# Patient Record
Sex: FEMALE | Race: BLACK OR AFRICAN AMERICAN | NOT HISPANIC OR LATINO | URBAN - METROPOLITAN AREA
[De-identification: names, ages, dates, MRNs, and addresses within clinical notes are randomized per-mention and may not be internally consistent; named-entity substitution may affect disease eponyms.]

---

## 2017-09-22 DIAGNOSIS — M54.50 LOW BACK PAIN: Primary | ICD-10-CM

## 2017-10-02 ENCOUNTER — CLINICAL SUPPORT (OUTPATIENT)
Dept: REHABILITATION | Facility: HOSPITAL | Age: 67
End: 2017-10-02
Attending: NEUROLOGICAL SURGERY
Payer: MEDICARE

## 2017-10-02 DIAGNOSIS — G89.29 CHRONIC LOW BACK PAIN, UNSPECIFIED BACK PAIN LATERALITY, WITH SCIATICA PRESENCE UNSPECIFIED: Primary | ICD-10-CM

## 2017-10-02 DIAGNOSIS — M54.5 CHRONIC LOW BACK PAIN, UNSPECIFIED BACK PAIN LATERALITY, WITH SCIATICA PRESENCE UNSPECIFIED: Primary | ICD-10-CM

## 2017-10-02 PROCEDURE — G8978 MOBILITY CURRENT STATUS: HCPCS | Mod: CK

## 2017-10-02 PROCEDURE — G8979 MOBILITY GOAL STATUS: HCPCS | Mod: CK

## 2017-10-02 PROCEDURE — 97161 PT EVAL LOW COMPLEX 20 MIN: CPT

## 2017-10-03 NOTE — PLAN OF CARE
Physical Therapy Evaluation    [facility]  Clinic Number: [id number]      Diagnosis:   Encounter Diagnosis   Name Primary?    Chronic low back pain, unspecified back pain laterality, with sciatica presence unspecified Yes     Physician: Zachery Roberts MD  Treatment Orders: Aquatic Therapy    Past Medical History:   Diagnosis Date    Ulcer      Current Outpatient Prescriptions   Medication Sig    dicyclomine (BENTYL) 20 mg tablet Take 20 mg by mouth as needed.    tramadol (ULTRAM) 50 mg tablet Take 50 mg by mouth as needed for Pain.     No current facility-administered medications for this visit.      Review of patient's allergies indicates:  Allergies not on file  Precautions: none    Evaluation Date: 10/02/2017  Visit # authorized: 1/20  Authorization expiration: 12/31/2017  Plan of Care Expiration: 11/2/2017    Subjective   Onset/LIVAN: gradual    Primary concern/ Chief complaints:    Sunni is a 67 y.o. female with a PMH of none that presents to [facility] outpatient physical therapy secondary to low back pain. Pt reports that her MD states that she has problems with discs in her back. Pt reports that she has pain in her mid-back, near her bra line. Pt reports that she had a back surgery on 8/11/2017 to drain CSF (port that lasted 6 days). Pt reports having no problems in her mid-back until after that time frame. Pt reports difficulty with lifting objects, standing, doing her dishes and bending. Pt was helping her son move out of her house and even with carrying light objects, she would have back pain. Pt uses pain medication as needed to control low back pain symptoms. Pt has a decreased ability to perform ADLs such as lifting, cleaning her house, doing her dishes, bending over. Pt reports intermittent numbness and tingling in the left arm and leg, reported to be mostly gone. Pt currently has some AROM exercises that she participates in, that she learned from when her mother was in PT. No  cultural, environmental, or spiritual barriers identified to treatment or learning.    Pain Scale: Sunni rates pain on a scale of 0-10 to be 8 at worst; 2 currently; n/a at best (first thing in the morning, and on days with low physical activity).    Patient Goals: Pt would like to decrease pain and increase function so she can return to pain-free completion of all normal daily activities.        Objective     Observation: ambulates independently, no brace present    Posture:  Mildly decreased lordosis    Lumbar Range of Motion:    Degrees Pain   Flexion 60 degrees  (60 is norm) -        Extension 30 degrees  (25 is norm) + to B hips        Left Side Bending 25 degrees  (25 is norm) Pain in R QL        Right Side Bending 25 degrees  (25 is norm) Sharp pain on R side of low back        Left Rotation   100% -        Right Rotation   100% -           Throbbing pain at T11-12 after AROM measurements    Lower Extremity Strength  Right LE  Left LE    Knee extension: 4+/5 Knee extension: 4+/5   Knee flexion: 5/5 Knee flexion: 4+/5   Hip flexion: 4+/5 Hip flexion: 4-/5   Hip extension:  4-/5 Hip extension: 4-/5   Hip abduction: 4+/5 Hip abduction: 4+/5   Hip adduction: 5/5 Hip adduction 5/5   Ankle dorsiflexion: 4+/5 Ankle dorsiflexion: 4+/5   Ankle plantarflexion: 5/5 Ankle plantarflexion: 5/5       Special Tests:  -SlumpTest: -  -Bridge Test: +    Joint Mobility: WNL to PAs in standing    Palpation: WNL    Sensation: WNL    Flexibility: decreased in bilateral hamstrings as assessed via presence of knee flexion with lumbar flexion    Outcome measures:    FOTO back: patient score = 57% impairment    G-code mobility current:  CK (indicating 40-60% impaired)    G-code mobility goal:  CK (indicating 40-60% impaired)    PT reviewed FOTO scores for Sunni No on 10/02/2017.   FOTO scores were entered into [employer] - see media section.    PT Evaluation Completed? Yes  Discussed Plan of Care with patient:  Yes    Assessment     This is a 67 y.o. female referred to outpatient physical therapy and presents with a medical diagnosis of low back pain and demonstrates limitations as described in the problem list. Pt will benefit from physical therapy services in order to maximize pain free functional independence. The following goals were discussed with the patient and patient is in agreement with them as to be addressed in the treatment plan.     Pt presents with limitations in lumbar AROM in extension and side bending and presented with mildly decreased lumbar lordosis. Pt presents with tight hamstrings and weak core musculature indicating decreased support of the lumbar spine with completion of ADLs. In order to decrease stresses on the lumbar spine with completion of functional movements, Sunni No  would greatly benefit from stretching of bilateral hamstrings and hip flexors as well as strengthening of core and hip musculature in order to increase dynamic stability at the lumbar spine and improve biomechanics throughout the lumbar spine.       Patient History Examination Clinical Presentation Clinical Decision Making   Comorbidities:  none    Personal Factors:  none       Activity and Participation Restriction:  Mobility  General tasks and demands    Body Systems:  Musculoskeletal      Body Regions:  Lumbar Stable and uncomplicated     Low            Medical necessity is demonstrated by the following IMPAIRMENTS/PROBLEM LIST:   1)Increase in pain level limiting function   2)Decreased range of motion limiting function   3)Decreased strength limiting function   4)Impaired posture   5)Lack of HEP    GOALS: Short Term Goals: 3 weeks  1. Report decreased low back pain  <   / =  2  /10  to increase tolerance for completion of ADLs  2. Pt to increase B popliteal angle by > or = 10 degrees in order to improve flexibility and posture.   3. Increased MMT  for  hip extension to 4+/5 bilaterally to increase tolerance  for ADL and work activities.  4. Pt to achieve 60 degrees of active lumbar flexion in order to demonstrate improvements in mobility.   5. Pt to tolerate HEP to improve ROM and independence with ADL's    Long Term Goals: 6 weeks  1. Report decreased low back pain  <   / =  1 /10  to increase tolerance for completion of ADLs  2. Pt to increase B popliteal angle by > or = 20 degrees in order to improve flexibility and posture.   3. Increased MMT  for  hip extension to 5/5 bilaterally to increase tolerance for ADL and work activities.  4. Pt will report at CJ level (20-40% impaired) on FOTO score for low back pain disability to demonstrate decrease in disability and improvement in back pain.  5. Pt to be Independent with HEP to improve ROM and independence with ADL's  6. Pt to demonstrate negative Bridge Test in order to show improved core strength for lumbar stabilization.     Plan     Pt will be treated by physical therapy 1-3 times a week for 6 weeks for Pt Education, HEP, therapeutic exercises, neuromuscular re-education, joint mobilizations, modalities prn to achieve established goals. Sunni may at times be seen by a PTA as part of the Rehab Team.     Cont PT for 6 weeks.     Kellie Stafford, DPT  10/02/2017.     I certify the need for these services furnished under this plan of treatment and while under my care.    ______________________________ Physician/Referring Practitioner  Date of Signature

## 2017-10-04 ENCOUNTER — CLINICAL SUPPORT (OUTPATIENT)
Dept: REHABILITATION | Facility: HOSPITAL | Age: 67
End: 2017-10-04
Attending: NEUROLOGICAL SURGERY
Payer: MEDICARE

## 2017-10-04 DIAGNOSIS — M54.40 CHRONIC LOW BACK PAIN WITH SCIATICA, SCIATICA LATERALITY UNSPECIFIED, UNSPECIFIED BACK PAIN LATERALITY: Primary | ICD-10-CM

## 2017-10-04 DIAGNOSIS — G89.29 CHRONIC LOW BACK PAIN WITH SCIATICA, SCIATICA LATERALITY UNSPECIFIED, UNSPECIFIED BACK PAIN LATERALITY: Primary | ICD-10-CM

## 2017-10-04 PROCEDURE — 97113 AQUATIC THERAPY/EXERCISES: CPT

## 2017-10-05 NOTE — PROGRESS NOTES
"?  Aquatic Therapy Encounter Note   Diagnosis:   Encounter Diagnosis   Name Primary?    Chronic low back pain, unspecified back pain laterality, with sciatica presence unspecified Yes      Physician: Zachery Roberts MD    Subjective:Pt states feeling fine with mild pain on her lower back. Pt does not verbalize a pain number.     Objective:  Treatment: Pt was instructed in and performed aquatic therex to help develop strength, core stability and flexibility/rom.  Aquatic Therex included:  Warm up laps fwd/bwd/side 3 laps each  HSS 3x20"  Quad str 3x20" (NP)   Lower extremity aquatic therex: 10 each  -Mini squats with quad set  -Heel raise with glut set  -Hip flx with laq  -Hip ext with hs curl  -Lunges front/back/side/side  Upper extremity aquatic therex: 10 reps each  -shld flx/ext  -shld horiz abd/add  -Lat pull (NP)  -shld rows (NP)   Marching:3 minutes   Bicycling:(NP)   Cool down laps fwd/bwd/side 1 lap each  HEP reviewed with pt with verbal understanding.    Assessment: Pt with good tolerance to PT today, increase of time to complete task; therefore, some exercises not performed secondary to poor endurance. Progressing with aquatic therapy. Pt will continue to benefit from skilled PT intervention.   Medical necessity is demonstrated by pt's:  Continued pain  Decreased functional activity  Limited strength and mobility  Goals: continue with established goals  Progress towards goals is:good   Plan: continue with established plan of care per PT. PT/PTA face-to-face:discussed pt's POC and progress towards established PT goals.  Louise Locke PTA'  Kellie Stafford PT      ?  "

## 2017-10-11 ENCOUNTER — CLINICAL SUPPORT (OUTPATIENT)
Dept: REHABILITATION | Facility: HOSPITAL | Age: 67
End: 2017-10-11
Attending: NEUROLOGICAL SURGERY
Payer: MEDICARE

## 2017-10-11 DIAGNOSIS — G89.29 CHRONIC MIDLINE LOW BACK PAIN WITH SCIATICA, SCIATICA LATERALITY UNSPECIFIED: Primary | ICD-10-CM

## 2017-10-11 DIAGNOSIS — M54.40 CHRONIC MIDLINE LOW BACK PAIN WITH SCIATICA, SCIATICA LATERALITY UNSPECIFIED: Primary | ICD-10-CM

## 2017-10-11 PROCEDURE — 97113 AQUATIC THERAPY/EXERCISES: CPT

## 2017-10-11 NOTE — PROGRESS NOTES
"?  Aquatic Therapy Encounter Note   Diagnosis:        Encounter Diagnosis   Name Primary?    Chronic low back pain, unspecified back pain laterality, with sciatica presence unspecified Yes      Physician: Zachery Roberts MD    Subjective:Pt states that she was very sore after last PT visit; feeling muscles that she has not worked before. Pt states having mild pain now but had an increase of pain yesterday after picking up heavy (~5lbs) boxes. Pt states that she took on pain pill yesterday.     Objective:  Treatment: Pt was instructed in and performed aquatic therex to help develop strength, core stability and flexibility/rom.  Aquatic Therex included:  Warm up laps fwd/bwd/side 3 laps each  HSS 3x20"  Quad str 3x20"   Lower extremity aquatic therex: 15 each  -Mini squats with quad set  -Heel raise with glut set  -Hip abduction   -Hip flx with laq  -Hip ext with hs curl  -Lunges front/back/side/side  Upper extremity aquatic therex: 10 reps each  -shld flx/ext  -shld horiz abd/add  -Lat pull otb  -shld rows rtb   Marching:3 minutes   Bicycling:(NP)   Cool down laps fwd/bwd/side 1 lap each  HEP reviewed with pt with verbal understanding.    Assessment: Pt with good tolerance to PT today, improving body mechanics while performing therex. Progressing with aquatic therapy. Pt will continue to benefit from skilled PT intervention.   Medical necessity is demonstrated by pt's:  Continued pain  Decreased functional activity  Limited strength and mobility  Goals: continue with established goals  Progress towards goals is:good     Plan: continue with established plan of care per PT. ?  "

## 2017-10-13 ENCOUNTER — CLINICAL SUPPORT (OUTPATIENT)
Dept: REHABILITATION | Facility: HOSPITAL | Age: 67
End: 2017-10-13
Attending: NEUROLOGICAL SURGERY
Payer: MEDICARE

## 2017-10-13 DIAGNOSIS — G89.29 CHRONIC MIDLINE LOW BACK PAIN WITH SCIATICA, SCIATICA LATERALITY UNSPECIFIED: Primary | ICD-10-CM

## 2017-10-13 DIAGNOSIS — M54.40 CHRONIC MIDLINE LOW BACK PAIN WITH SCIATICA, SCIATICA LATERALITY UNSPECIFIED: Primary | ICD-10-CM

## 2017-10-13 PROCEDURE — 97113 AQUATIC THERAPY/EXERCISES: CPT

## 2017-10-13 NOTE — PROGRESS NOTES
"                                                                                                                         Aquatic Progress Note    Total treatment time: 60    Time In: 1:00  Time Out: 2:05      Subjective  Sunni states "that she is w/o LBP at this time. Pt states "It feels like a knife is stabbing me in the back"  with horiz row ex      Objective    Treatment: Sunni was instructed in and performed therapeutic exercises to develop strength, endurance, ROM, flexibility, balance, posture and core stabilization for 60 minutes, progressing with reps on this date . Patient performed therapeutic exercises consisting of warm up laps without resistance, PROM/Stretching, UE strengthening, LE strengthening, lumbar stablization, balance exercises, isometrics, Endurance, march, theraband and cool down.      Warm up laps fwd/bwd/side 3 laps each    HSS 3x20"  Quad str 3x20"     Lower extremity exs: 15 each  -Mini squats with quad set  -Heel raise with glut set  -Hip flx with laq  -Hip ext with hs curl  -Lunges front/side    Upper extremity exs: 15 reps each  -shld flx/ext  -shld horiz abd/add  -Lat pull otb  -shld rows rtb     Endurance 4 min  Marching:   Bicycling:(NP)     Cool down laps fwd/bwd/side 1 lap each     Patient was not issued HEP for pool.      Assessment  Patient's tolerance to treatment with progression of ther ex was good. This is a 67 y.o. female referred to outpatient physical therapy and presents with a medical diagnosis of low back pain and demonstrates limitations as described in the problem list. Pt will benefit from physical therapy services in order to maximize pain free functional independence.  Patient will continue to benefit from skilled PT intervention.    Sunni is making good progress towards established goals.      Plan  Continue 2x per week.    "

## 2017-10-17 ENCOUNTER — CLINICAL SUPPORT (OUTPATIENT)
Dept: REHABILITATION | Facility: HOSPITAL | Age: 67
End: 2017-10-17
Attending: NEUROLOGICAL SURGERY
Payer: MEDICARE

## 2017-10-17 DIAGNOSIS — G89.29 CHRONIC MIDLINE LOW BACK PAIN WITH SCIATICA, SCIATICA LATERALITY UNSPECIFIED: Primary | ICD-10-CM

## 2017-10-17 DIAGNOSIS — M54.40 CHRONIC MIDLINE LOW BACK PAIN WITH SCIATICA, SCIATICA LATERALITY UNSPECIFIED: Primary | ICD-10-CM

## 2017-10-17 PROCEDURE — 97113 AQUATIC THERAPY/EXERCISES: CPT

## 2017-10-17 NOTE — PROGRESS NOTES
"                                                                                                                         Aquatic Progress Note    Total treatment time: 60    Time In: 2:00  Time Out: 3:05      Subjective  Sunni states "that she is w/o LBP at this time. and that she has felt better since last tx.       Objective    Treatment: Sunni was instructed in and performed therapeutic exercises to develop strength, endurance, ROM, flexibility, balance, posture and core stabilization for 60 minutes, progressing with reps on this date . Patient performed therapeutic exercises consisting of warm up laps without resistance, PROM/Stretching, UE strengthening, LE strengthening, lumbar stablization, balance exercises, isometrics, Endurance, march, theraband and cool down.      Warm up laps fwd/bwd/side 3 laps each    HSS 3x20"  Quad str 3x20"     Lower extremity exs: 20 each  -Mini squats with quad set  -Heel raise with glut set  -Hip flx with laq  -Hip ext with hs curl  -Lunges front/side    Upper extremity exs: 20 reps each  -shld flx/ext  -shld horiz abd/add  -Lat pull otb  -shld rows rtb     Endurance 4 min  Marching:   Bicycling:(NP)     Cool down laps fwd/bwd/side 1 lap each     Patient was not issued HEP for pool.      Assessment  Patient's tolerance to treatment with progression of ther ex was good. This is a 67 y.o. female referred to outpatient physical therapy and presents with a medical diagnosis of low back pain and demonstrates limitations as described in the problem list. Pt will benefit from physical therapy services in order to maximize pain free functional independence.  Patient will continue to benefit from skilled PT intervention.    Sunni is making good progress towards established goals.      Plan  Continue 2x per week.  "

## 2017-10-20 ENCOUNTER — CLINICAL SUPPORT (OUTPATIENT)
Dept: REHABILITATION | Facility: HOSPITAL | Age: 67
End: 2017-10-20
Attending: NEUROLOGICAL SURGERY
Payer: MEDICARE

## 2017-10-20 DIAGNOSIS — G89.29 CHRONIC MIDLINE LOW BACK PAIN WITH SCIATICA, SCIATICA LATERALITY UNSPECIFIED: Primary | ICD-10-CM

## 2017-10-20 DIAGNOSIS — M54.40 CHRONIC MIDLINE LOW BACK PAIN WITH SCIATICA, SCIATICA LATERALITY UNSPECIFIED: Primary | ICD-10-CM

## 2017-10-20 PROCEDURE — 97113 AQUATIC THERAPY/EXERCISES: CPT

## 2017-10-20 NOTE — PROGRESS NOTES
"                                                                                                                         Aquatic Progress Note    Total treatment time: 60    Time In: 1:00  Time Out: 2:05      Subjective  Sunni reports 5/10 mid LBP today       Objective    Treatment: Sunni was instructed in and performed therapeutic exercises to develop strength, endurance, ROM, flexibility, balance, posture and core stabilization for 60 minutes, progressing with reps on this date . Patient performed therapeutic exercises consisting of warm up laps without resistance, PROM/Stretching, UE strengthening, LE strengthening, lumbar stablization, balance exercises, isometrics, Endurance, march, theraband and cool down.      Warm up laps fwd/bwd/side 3 laps each    HSS 3x20"  Quad str 3x20"     Lower extremity exs: 30 each  -Mini squats with quad set  -Heel raise with glut set  -Hip flx with laq  -Hip ext with hs curl  -Lunges front/side    Upper extremity exs: 30 reps each  -shld flx/ext  -shld horiz abd/add  -Lat pull otb  -shld rows rtb     Endurance 5 min  Marching:   Bicycling:(NP)     Cool down laps fwd/bwd/side 1 lap each     Patient was not issued HEP for pool.      Assessment  Patient's tolerance to treatment with progression of ther ex was good. This is a 67 y.o. female referred to outpatient physical therapy and presents with a medical diagnosis of low back pain and demonstrates limitations as described in the problem list. Pt will benefit from physical therapy services in order to maximize pain free functional independence.  Patient will continue to benefit from skilled PT intervention.    Sunni is making good progress towards established goals.      Plan  Continue 2x per week.  "

## 2017-10-25 ENCOUNTER — CLINICAL SUPPORT (OUTPATIENT)
Dept: REHABILITATION | Facility: HOSPITAL | Age: 67
End: 2017-10-25
Attending: NEUROLOGICAL SURGERY
Payer: MEDICARE

## 2017-10-25 DIAGNOSIS — M54.40 CHRONIC MIDLINE LOW BACK PAIN WITH SCIATICA, SCIATICA LATERALITY UNSPECIFIED: Primary | ICD-10-CM

## 2017-10-25 DIAGNOSIS — G89.29 CHRONIC MIDLINE LOW BACK PAIN WITH SCIATICA, SCIATICA LATERALITY UNSPECIFIED: Primary | ICD-10-CM

## 2017-10-25 PROCEDURE — G8979 MOBILITY GOAL STATUS: HCPCS | Mod: CK

## 2017-10-25 PROCEDURE — G8978 MOBILITY CURRENT STATUS: HCPCS | Mod: CK

## 2017-10-25 PROCEDURE — 97110 THERAPEUTIC EXERCISES: CPT

## 2017-10-25 NOTE — PROGRESS NOTES
Physical Therapy Pool Reassessment     Name: Sunni Andrade  Clinic Number: [id number]        Diagnosis:        Encounter Diagnosis   Name Primary?    Chronic low back pain, unspecified back pain laterality, with sciatica presence unspecified Yes      Physician: Zachery Roberts MD  Treatment Orders: Aquatic Therapy          Past Medical History:   Diagnosis Date    Ulcer             Current Outpatient Prescriptions   Medication Sig    dicyclomine (BENTYL) 20 mg tablet Take 20 mg by mouth as needed.    tramadol (ULTRAM) 50 mg tablet Take 50 mg by mouth as needed for Pain.      No current facility-administered medications for this visit.       Review of patient's allergies indicates:  Allergies not on file  Precautions: none     Evaluation Date: 10/02/2017  Visit # authorized: 6/20  Authorization expiration: 12/31/2017  Plan of Care Expiration: 11/2/2017     Subjective   Onset/LIVAN: gradual     Primary concern/ Chief complaints:     Sunni is a 67 y.o. female with a PMH of none that presents to [facility] outpatient physical therapy secondary to low back pain. Pt reports that her MD states that she has problems with discs in her back. Pt reports that she has pain in her mid-back, near her bra line. Pt reports that she had a back surgery on 8/11/2017 to drain CSF (port that lasted 6 days). Pt reports having no problems in her mid-back until after that time frame. Pt reports difficulty with lifting objects, standing, doing her dishes and bending. Pt was helping her son move out of her house and even with carrying light objects, she would have back pain. Pt uses pain medication as needed to control low back pain symptoms. Pt has a decreased ability to perform ADLs such as lifting, cleaning her house, doing her dishes, bending over. Pt reports intermittent numbness and tingling in the left arm and leg, reported to be mostly gone. Pt currently has some AROM exercises that she participates in, that she learned from  when her mother was in PT. No cultural, environmental, or spiritual barriers identified to treatment or learning.     Since starting aqua therapy, pt reports: Doing well in the pool and doesn't have pain in the mornings like she used to. Has more mid-day pain and reports no more pain when she puts on her bra. Is able to wash dishes with decreased difficulty. No numbness or tingling in the left arm and leg.    Pain Scale: Sunni rates pain on a scale of 0-10 to be 8 at worst; 4 currently; n/a at best (first thing in the morning, and on days with low physical activity).     Patient Goals: Pt would like to decrease pain and increase function so she can return to pain-free completion of all normal daily activities.           Objective      Observation: ambulates independently, no brace present     Posture:  Mildly decreased lordosis     Lumbar Range of Motion:     Degrees Pain   Flexion 60 degrees  (60 is norm) Pain in low back and mid back with bending forward   Extension 30 degrees  (25 is norm) NO PAIN TODAY    Left Side Bending 25 degrees  (25 is norm) Stretching in R QL   Right Side Bending 25 degrees  (25 is norm) Mild pain in low back (decreased compared to initial evaluation)   Left Rotation 100% -   Right Rotation 100% -         Lower Extremity Strength  Right LE   Left LE     Knee extension: 5/5 Knee extension: 4+/5   Knee flexion: 5/5 Knee flexion: 4+/5   Hip flexion: 5/5 Hip flexion: 4-/5   Hip extension:  4/5 Hip extension: 4-/5   Hip abduction: 4+/5 Hip abduction: 4+/5   Hip adduction: 5/5 Hip adduction 5/5   Ankle dorsiflexion: 4+/5 Ankle dorsiflexion: 4+/5   Ankle plantarflexion: 5/5 Ankle plantarflexion: 5/5         Special Tests:  -SlumpTest: -  -Bridge Test: +     Pt received therapeutic exercises for 20 minutes including: supine HSS 3x30 sec BLE. And standing multifidus press-outs 2x10 both sides with GTB     Assessment      This is a 67 y.o. female referred to outpatient physical therapy and  presents with a medical diagnosis of low back pain and demonstrates limitations as described in the problem list. Pt will benefit from physical therapy services in order to maximize pain free functional independence. The following goals were discussed with the patient and patient is in agreement with them as to be addressed in the treatment plan.      Pt presents with limitations in lumbar AROM in extension and side bending and presented with mildly decreased lumbar lordosis, but made improvements in pain presentation today. Mild increases in strength noted in the R side, but remains weak on her left with back pain caused by seated hip flexion. Pt is progressing well and was given a HEP today including multifidus press-outs and hamstring stretches (HEP2go code = [id number]). Pt presents with tight hamstrings and weak core musculature indicating decreased support of the lumbar spine with completion of ADLs. In order to decrease stresses on the lumbar spine with completion of functional movements, Sunni No  would greatly benefit from stretching of bilateral hamstrings and hip flexors as well as strengthening of core and hip musculature in order to increase dynamic stability at the lumbar spine and improve biomechanics throughout the lumbar spine.         Patient History Examination Clinical Presentation Clinical Decision Making   Comorbidities:  none     Personal Factors:  none       Activity and Participation Restriction:  Mobility  General tasks and demands     Body Systems:  Musculoskeletal        Body Regions:  Lumbar Stable and uncomplicated    Low             Medical necessity is demonstrated by the following IMPAIRMENTS/PROBLEM LIST:              1)Increase in pain level limiting function              2)Decreased range of motion limiting function              3)Decreased strength limiting function              4)Impaired posture              5)Lack of HEP     GOALS: Short Term Goals: 3 weeks  1.  Report decreased low back pain  <   / =  2  /10  to increase tolerance for completion of ADLs  2. Pt to increase B popliteal angle by > or = 10 degrees in order to improve flexibility and posture.   3. Increased MMT  for  hip extension to 4+/5 bilaterally to increase tolerance for ADL and work activities.  4. Pt to achieve 60 degrees of active lumbar flexion in order to demonstrate improvements in mobility.  MET:10/25/2017  5. Pt to tolerate HEP to improve ROM and independence with ADL's MET:10/25/2017     Long Term Goals: 6 weeks  1. Report decreased low back pain  <   / =  1 /10  to increase tolerance for completion of ADLs  2. Pt to increase B popliteal angle by > or = 20 degrees in order to improve flexibility and posture.   3. Increased MMT  for  hip extension to 5/5 bilaterally to increase tolerance for ADL and work activities.  4. Pt will report at CJ level (20-40% impaired) on FOTO score for low back pain disability to demonstrate decrease in disability and improvement in back pain.  5. Pt to be Independent with HEP to improve ROM and independence with ADL's  6. Pt to demonstrate negative Bridge Test in order to show improved core strength for lumbar stabilization.      Plan      Pt will be treated by physical therapy 1-3 times a week for 6 weeks for Pt Education, HEP, therapeutic exercises, neuromuscular re-education, joint mobilizations, modalities prn to achieve established goals. Sunni may at times be seen by a PTA as part of the Rehab Team.      Cont PT for 6 weeks.      Kellie Stafford, NELSON  10/25/2017.      I certify the need for these services furnished under this plan of treatment and while under my care.     ______________________________ Physician/Referring Practitioner  Date of Signature

## 2017-11-03 ENCOUNTER — CLINICAL SUPPORT (OUTPATIENT)
Dept: REHABILITATION | Facility: HOSPITAL | Age: 67
End: 2017-11-03
Attending: NEUROLOGICAL SURGERY
Payer: MEDICARE

## 2017-11-03 DIAGNOSIS — M54.40 CHRONIC LOW BACK PAIN WITH SCIATICA, SCIATICA LATERALITY UNSPECIFIED, UNSPECIFIED BACK PAIN LATERALITY: Primary | ICD-10-CM

## 2017-11-03 DIAGNOSIS — G89.29 CHRONIC LOW BACK PAIN WITH SCIATICA, SCIATICA LATERALITY UNSPECIFIED, UNSPECIFIED BACK PAIN LATERALITY: Primary | ICD-10-CM

## 2017-11-03 PROCEDURE — 97113 AQUATIC THERAPY/EXERCISES: CPT

## 2017-11-03 NOTE — PROGRESS NOTES
"                                                                                                                         Aquatic Progress Note    Total treatment time: 60    Time In: 2:00  Time Out: 3:05      Subjective  Sunni states that her back is feeling better today, reports 2/10 mid LBP today       Objective    Treatment: Sunni was instructed in and performed therapeutic exercises to develop strength, endurance, ROM, flexibility, balance, posture and core stabilization for 60 minutes, progressing with reps on this date . Patient performed therapeutic exercises consisting of warm up laps without resistance, PROM/Stretching, UE strengthening, LE strengthening, lumbar stablization, balance exercises, isometrics, Endurance, march, theraband and cool down.      Warm up laps fwd/bwd/side 3 laps each    HSS 3x20"  Quad str 3x20"     Lower extremity exs: 30 each  -Mini squats with quad set  -Heel raise with glut set  -Hip flx with laq  -Hip ext with hs curl  -Lunges front/side    Upper extremity exs: 30 reps each  -shld flx/ext  -shld horiz abd/add  -Lat pull otb  -shld rows rtb     Endurance 5 min  Marching:   Bicycling:(NP)     Cool down laps fwd/bwd/side 1 lap each     Patient was not issued HEP for pool.      Assessment  Patient's tolerance to treatment with ther ex was good, w/o c/o LBP sx throughout tx, minor bal difficulty with core stab exs, however pt was able to self correct. Pt was not progressed on this date due to length of time since last tx. This is a 67 y.o. female referred to outpatient physical therapy and presents with a medical diagnosis of low back pain and demonstrates limitations as described in the problem list. Pt will benefit from physical therapy services in order to maximize pain free functional independence.  Patient will continue to benefit from skilled PT intervention.    Sunni is making good progress towards established goals.      Plan  Continue 2x per week.  "

## 2017-11-08 ENCOUNTER — CLINICAL SUPPORT (OUTPATIENT)
Dept: REHABILITATION | Facility: HOSPITAL | Age: 67
End: 2017-11-08
Attending: NEUROLOGICAL SURGERY
Payer: MEDICARE

## 2017-11-08 DIAGNOSIS — G89.29 CHRONIC LOW BACK PAIN WITH SCIATICA, SCIATICA LATERALITY UNSPECIFIED, UNSPECIFIED BACK PAIN LATERALITY: Primary | ICD-10-CM

## 2017-11-08 DIAGNOSIS — M54.40 CHRONIC LOW BACK PAIN WITH SCIATICA, SCIATICA LATERALITY UNSPECIFIED, UNSPECIFIED BACK PAIN LATERALITY: Primary | ICD-10-CM

## 2017-11-08 PROCEDURE — 97113 AQUATIC THERAPY/EXERCISES: CPT

## 2017-11-08 NOTE — PROGRESS NOTES
"                                                                                                                         Aquatic Progress Note    Total treatment time: 60    Time In: 2:30  Time Out: 3:35      Subjective  Sunni states that her back pain has increased today and reports 5 or 6/10 mid LBP today.       Objective    Treatment: Sunni was instructed in and performed therapeutic exercises to develop strength, endurance, ROM, flexibility, balance, posture and core stabilization for 60 minutes, progressing with reps on this date . Patient performed therapeutic exercises consisting of warm up laps without resistance, PROM/Stretching, UE strengthening, LE strengthening, lumbar stablization, balance exercises, isometrics, Endurance, march, theraband and cool down.      Warm up laps fwd/bwd/side 3 laps each    HSS 3x20"  Quad str 3x20"     Lower extremity exs: 30x each  -Mini squats with quad set  -Heel raise with glut set  -Hip flx with laq  -Hip ext with hs curl  -Lunges fwd 15x    Upper extremity exs: 30 reps each  -shld flx/ext  -shld horiz abd/add  -Lat pull otb  -shld rows rtb     Endurance 5 min  Marching:   Bicycling:(NP)     Cool down laps fwd/bwd/side 1 lap each     Patient was not issued HEP for pool.      Assessment  Pt c/o of LBP post lateral lunges, hence, that exercise was attempted but not performed during today's tx session. Pt demonstrated minor bal difficulty with core stab exs, however pt was able to self correct. Pt was not progressed on this date due to increased LBP upon arrival. This is a 67 y.o. female referred to outpatient physical therapy and presents with a medical diagnosis of low back pain and demonstrates limitations as described in the problem list. Pt will benefit from physical therapy services in order to maximize pain free functional independence.  Patient will continue to benefit from skilled PT intervention.    Sunni is making good progress towards established " goals.      Plan  Continue 2x per week.

## 2017-11-13 ENCOUNTER — CLINICAL SUPPORT (OUTPATIENT)
Dept: REHABILITATION | Facility: HOSPITAL | Age: 67
End: 2017-11-13
Attending: NEUROLOGICAL SURGERY
Payer: MEDICARE

## 2017-11-13 DIAGNOSIS — G89.29 CHRONIC LOW BACK PAIN WITH SCIATICA, SCIATICA LATERALITY UNSPECIFIED, UNSPECIFIED BACK PAIN LATERALITY: Primary | ICD-10-CM

## 2017-11-13 DIAGNOSIS — M54.40 CHRONIC LOW BACK PAIN WITH SCIATICA, SCIATICA LATERALITY UNSPECIFIED, UNSPECIFIED BACK PAIN LATERALITY: Primary | ICD-10-CM

## 2017-11-13 PROCEDURE — 97113 AQUATIC THERAPY/EXERCISES: CPT

## 2017-11-13 NOTE — PROGRESS NOTES
"                                                                                                                         Aquatic Progress Note      Subjective  Sunni rates pain 6/10 today.        Objective    Treatment: Sunni was instructed in and performed therapeutic exercises to develop strength, endurance, ROM, flexibility, balance, posture and core stabilization for 60 minutes, progressing with reps on this date . Patient performed therapeutic exercises consisting of warm up laps without resistance, PROM/Stretching, UE strengthening, LE strengthening, lumbar stablization, balance exercises, isometrics, Endurance, march, theraband and cool down.      Warm up laps fwd/bwd/side 3 laps each    HSS 3x20"  Quad str 3x20"     Lower extremity exs: 30x each  -Mini squats with quad set  -Heel raise with glut set  -Hip flx with laq  -Hip ext with hs curl  -Lunges fwd 15x    Upper extremity exs: 30 reps each  -shld flx/ext  -shld horiz abd/add  -Lat pull otb  -shld rows rtb     Endurance 5 min  Marching:   Bicycling:(NP)     Cool down laps fwd/bwd/side 1 lap each     Patient was not issued HEP for pool.      Assessment  Pt with good tolerance to PT today, needs visual cues for proper body mechanics while performing stretches. Pt demonstrated minor bal difficulty with core stab exs, however pt was able to self correct. Pt was not progressed on this date due to increased LBP upon arrival. This is a 67 y.o. female referred to outpatient physical therapy and presents with a medical diagnosis of low back pain and demonstrates limitations as described in the problem list. Pt will benefit from physical therapy services in order to maximize pain free functional independence.  Patient will continue to benefit from skilled PT intervention.    Sunni is making good progress towards established goals.      Plan  Continue 2x per week.  "

## 2017-11-15 ENCOUNTER — CLINICAL SUPPORT (OUTPATIENT)
Dept: REHABILITATION | Facility: HOSPITAL | Age: 67
End: 2017-11-15
Attending: NEUROLOGICAL SURGERY
Payer: MEDICARE

## 2017-11-15 DIAGNOSIS — G89.29 CHRONIC LOW BACK PAIN WITH SCIATICA, SCIATICA LATERALITY UNSPECIFIED, UNSPECIFIED BACK PAIN LATERALITY: Primary | ICD-10-CM

## 2017-11-15 DIAGNOSIS — M54.40 CHRONIC LOW BACK PAIN WITH SCIATICA, SCIATICA LATERALITY UNSPECIFIED, UNSPECIFIED BACK PAIN LATERALITY: Primary | ICD-10-CM

## 2017-11-15 PROCEDURE — 97113 AQUATIC THERAPY/EXERCISES: CPT

## 2017-11-22 ENCOUNTER — CLINICAL SUPPORT (OUTPATIENT)
Dept: REHABILITATION | Facility: HOSPITAL | Age: 67
End: 2017-11-22
Attending: NEUROLOGICAL SURGERY
Payer: MEDICARE

## 2017-11-22 DIAGNOSIS — G89.29 CHRONIC LOW BACK PAIN WITH SCIATICA, SCIATICA LATERALITY UNSPECIFIED, UNSPECIFIED BACK PAIN LATERALITY: Primary | ICD-10-CM

## 2017-11-22 DIAGNOSIS — M54.40 CHRONIC LOW BACK PAIN WITH SCIATICA, SCIATICA LATERALITY UNSPECIFIED, UNSPECIFIED BACK PAIN LATERALITY: Primary | ICD-10-CM

## 2017-11-22 PROCEDURE — 97113 AQUATIC THERAPY/EXERCISES: CPT

## 2017-11-22 NOTE — PROGRESS NOTES
"                                                                                                                         Aquatic Progress Note      Subjective  Sunni states feeling fine without pain today but had 8/10 pain on her middle back.    Objective    Treatment: Sunni was instructed in and performed therapeutic exercises to develop strength, endurance, ROM, flexibility, balance, posture and core stabilization for 60 minutes, progressing with reps on this date . Patient performed therapeutic exercises consisting of warm up laps without resistance, PROM/Stretching, UE strengthening, LE strengthening, lumbar stablization, balance exercises, isometrics, Endurance, march, theraband and cool down.      Warm up laps fwd/bwd/side 3 laps each    HSS 3x20"  Quad str 3x20"     Lower extremity exs: 30x each  -Mini squats with quad set  -Heel raise with glut set  -Hip flx with laq  -Hip ext with hs curl  -Lunges fwd 15x    Upper extremity exs: 30 reps each  -shld flx/ext  -shld horiz abd/add  -Lat pull otb  -shld rows rtb     Endurance 5 min  Marching:   Bicycling:(NP)     Cool down laps fwd/bwd/side 1 lap each     Patient was not issued HEP for pool.      Assessment  Pt with good tolerance to PT today, needs visual cues for proper body mechanics while performing stretches. Pt demonstrated minor bal difficulty with core stab exs, however pt was able to self correct. Pt was not progressed on this date due to increased LBP upon arrival. This is a 67 y.o. female referred to outpatient physical therapy and presents with a medical diagnosis of low back pain and demonstrates limitations as described in the problem list. Pt will benefit from physical therapy services in order to maximize pain free functional independence.  Patient will continue to benefit from skilled PT intervention.    Sunni is making good progress towards established goals.      Plan  Pt will see PT next visit for re assessment.   "

## 2017-12-01 ENCOUNTER — CLINICAL SUPPORT (OUTPATIENT)
Dept: REHABILITATION | Facility: HOSPITAL | Age: 67
End: 2017-12-01
Attending: NEUROLOGICAL SURGERY
Payer: MEDICARE

## 2017-12-01 DIAGNOSIS — M54.40 CHRONIC LOW BACK PAIN WITH SCIATICA, SCIATICA LATERALITY UNSPECIFIED, UNSPECIFIED BACK PAIN LATERALITY: Primary | ICD-10-CM

## 2017-12-01 DIAGNOSIS — G89.29 CHRONIC LOW BACK PAIN WITH SCIATICA, SCIATICA LATERALITY UNSPECIFIED, UNSPECIFIED BACK PAIN LATERALITY: Primary | ICD-10-CM

## 2017-12-01 PROCEDURE — G8978 MOBILITY CURRENT STATUS: HCPCS | Mod: CK

## 2017-12-01 PROCEDURE — G8979 MOBILITY GOAL STATUS: HCPCS | Mod: CK

## 2017-12-01 PROCEDURE — 97110 THERAPEUTIC EXERCISES: CPT

## 2017-12-01 NOTE — PROGRESS NOTES
Physical Therapy Pool Reassessment     Name: Sunni No  Clinic Number: [id number]        Diagnosis:        Encounter Diagnosis   Name Primary?    Chronic low back pain, unspecified back pain laterality, with sciatica presence unspecified Yes      Physician: Zachery Roberts MD  Treatment Orders: Aquatic Therapy          Past Medical History:   Diagnosis Date    Ulcer             Current Outpatient Prescriptions   Medication Sig    dicyclomine (BENTYL) 20 mg tablet Take 20 mg by mouth as needed.    tramadol (ULTRAM) 50 mg tablet Take 50 mg by mouth as needed for Pain.      No current facility-administered medications for this visit.       Review of patient's allergies indicates:  Allergies not on file  Precautions: none     Evaluation Date: 10/02/2017  Visit # authorized: 6/20  Authorization expiration: 12/31/2017  Plan of Care Expiration: 11/2/2017     Subjective   Onset/LIVAN: gradual     Primary concern/ Chief complaints:     Sunni is a 67 y.o. female with a PMH of none that presents to [facility] outpatient physical therapy secondary to low back pain.      Has been continuing to do well in the pool. Stubbed her toe and dislocated her left first toe. Was able to relocate but had to lie in bed for 2-3 days until she was able to walk better. Had a moment when doing laundry and had increased pain after that due to the bending and lifting. Is still taking care of her mother who has dementia, and when helping bathe her, she will have enough pain where she has to lie down for 2-3 hours after. Reports feeling better in her mid back. Reports a moment of tingling in the left arm and leg when doing household chores. Was able to decrease her pain with 800mg ibuprofen, but tried to take it as little as possible. Standing is better, but she will still get stabbing pain in her back. Her pain is milder.     Pain Scale: Sunni rates pain on a scale of 0-10 to be 8 at worst; 4 currently; n/a at best (first thing in the  morning, and on days with low physical activity).     Patient Goals: Pt would like to decrease pain and increase function so she can return to pain-free completion of all normal daily activities.           Objective      Observation: ambulates independently, no brace present     Posture:  Mildly decreased lordosis     Lumbar Range of Motion:     Degrees Pain   Flexion 60 degrees  (60 is norm) Pain in low back with bending forward   Extension 30 degrees  (25 is norm) NO PAIN TODAY    Left Side Bending 25 degrees  (25 is norm) Mild stretch   Right Side Bending 25 degrees  (25 is norm) Mild right hip pain, described as a strong ache   Left Rotation 100% -   Right Rotation 100% -         Lower Extremity Strength  Right LE   Left LE     Knee extension: 5/5 Knee extension: 4+/5   Knee flexion: 5/5 Knee flexion: 5/5   Hip flexion: 5/5 Hip flexion: *painful 4-/5   Hip extension:  4/5 Hip extension: 4/5   Hip abduction: 4+/5 Hip abduction: 4+/5   Hip adduction: 5/5 Hip adduction 5/5   Ankle dorsiflexion: 4+/5 Ankle dorsiflexion: 4+/5   Ankle plantarflexion: 5/5 Ankle plantarflexion: 5/5         Special Tests:  -SlumpTest: -  -Bridge Test: +     Pt received therapeutic exercises for 20 minutes including: supine HSS 3x30 sec BLE. And standing multifidus press-outs 2x10 both sides with GTB     Assessment      This is a 67 y.o. female referred to outpatient physical therapy and presents with a medical diagnosis of low back pain and demonstrates limitations as described in the problem list. Pt will benefit from physical therapy services in order to maximize pain free functional independence. The following goals were discussed with the patient and patient is in agreement with them as to be addressed in the treatment plan.      Pt presents with limitations in lumbar AROM in extension and side bending and presented with mildly decreased lumbar lordosis, but made improvements in pain presentation today. Mild increases in strength  noted in the R side, but remains weak on her left with back pain caused by seated hip flexion. Pt is progressing well and was given a HEP today including multifidus press-outs and hamstring stretches (HEP2go code = [id number]). Pt presents with tight hamstrings and weak core musculature indicating decreased support of the lumbar spine with completion of ADLs. Pt has made improvements in knee and hip strength, but remains very weak in her core and paraspinal musculature. Pt would benefit from progression to land in order to increase her strengthening at this time and to help her increase her range of motion which did not make a significant improvement based on today's measurements. In order to decrease stresses on the lumbar spine with completion of functional movements, Sunni No  would greatly benefit from stretching of bilateral hamstrings and hip flexors as well as strengthening of core and hip musculature in order to increase dynamic stability at the lumbar spine and improve biomechanics throughout the lumbar spine.         Patient History Examination Clinical Presentation Clinical Decision Making   Comorbidities:  none     Personal Factors:  none       Activity and Participation Restriction:  Mobility  General tasks and demands     Body Systems:  Musculoskeletal        Body Regions:  Lumbar Stable and uncomplicated    Low             Medical necessity is demonstrated by the following IMPAIRMENTS/PROBLEM LIST:              1)Increase in pain level limiting function              2)Decreased range of motion limiting function              3)Decreased strength limiting function              4)Impaired posture              5)Lack of HEP     GOALS: Short Term Goals: 3 weeks  1. Report decreased low back pain  <   / =  2  /10  to increase tolerance for completion of ADLs  2. Pt to increase B popliteal angle by > or = 10 degrees in order to improve flexibility and posture.   3. Increased MMT  for  hip extension  to 4+/5 bilaterally to increase tolerance for ADL and work activities.  4. Pt to achieve 60 degrees of active lumbar flexion in order to demonstrate improvements in mobility.  MET:10/25/2017  5. Pt to tolerate HEP to improve ROM and independence with ADL's MET:10/25/2017     Long Term Goals: 6 weeks  1. Report decreased low back pain  <   / =  1 /10  to increase tolerance for completion of ADLs  2. Pt to increase B popliteal angle by > or = 20 degrees in order to improve flexibility and posture.   3. Increased MMT  for  hip extension to 5/5 bilaterally to increase tolerance for ADL and work activities.  4. Pt will report at CJ level (20-40% impaired) on FOTO score for low back pain disability to demonstrate decrease in disability and improvement in back pain.  5. Pt to be Independent with HEP to improve ROM and independence with ADL's  6. Pt to demonstrate negative Bridge Test in order to show improved core strength for lumbar stabilization.      Plan      Pt will be treated by physical therapy 1-3 times a week for 6 weeks for Pt Education, HEP, therapeutic exercises, neuromuscular re-education, joint mobilizations, modalities prn to achieve established goals. Sunni may at times be seen by a PTA as part of the Rehab Team.      Cont PT for 6 weeks.      Kellie Stafford, NELSON  12/01/2017.      I certify the need for these services furnished under this plan of treatment and while under my care.     ______________________________ Physician/Referring Practitioner  Date of Signature

## 2017-12-11 ENCOUNTER — CLINICAL SUPPORT (OUTPATIENT)
Dept: REHABILITATION | Facility: HOSPITAL | Age: 67
End: 2017-12-11
Attending: NEUROLOGICAL SURGERY
Payer: MEDICARE

## 2017-12-11 DIAGNOSIS — M54.40 CHRONIC LOW BACK PAIN WITH SCIATICA, SCIATICA LATERALITY UNSPECIFIED, UNSPECIFIED BACK PAIN LATERALITY: Primary | ICD-10-CM

## 2017-12-11 DIAGNOSIS — G89.29 CHRONIC LOW BACK PAIN WITH SCIATICA, SCIATICA LATERALITY UNSPECIFIED, UNSPECIFIED BACK PAIN LATERALITY: Primary | ICD-10-CM

## 2017-12-11 PROCEDURE — 97110 THERAPEUTIC EXERCISES: CPT

## 2017-12-12 NOTE — PROGRESS NOTES
Physical Therapy Progress Note - Hip/Knee/Ankle/Spine  Diagnosis:           Encounter Diagnosis   Name Primary?    Chronic low back pain, unspecified back pain laterality, with sciatica presence unspecified Yes         Subjective:  Patient reports feeling fine and had to missed her appointments last week. Pt denies pain at this time. Pt received 1:1 care today.     Objective:  Treatment:   Pt received therapeutic exercises to improve ROM, strength, flexibility and proprioception such as:   UBE for spine muscles and up right posture - scapula retraction/protraction 3 minutes   Standing:gastroc stretch 1 minute   Long sitting: HS stretch 1 minutes each LE   LTR and BKTC using theraball 2 minutes each   Hook lying TA activation 10 reps hold 5 sec   Bridges with ball between knees 10 reps   SL clam shells and reverse clam shells 2 x 10 reps   SL hip abduction with min A from PTA 2 x 10 reps   Short sitting:piriforms stretch stretch 2 x 30 sec   ROM:not tested today.     Written Home Exercises Provided:no changes made.   Instructed pt. regarding: Proper technique with all exercises. Pt demonstrated good understanding of the education provided. No cultural, environmental, or spiritual barriers identified to treatment or learning.    Assessment:Pt with good tolerance to PT today, presents weak hip, core and lower back muscles weak. Pt needs Luz and tactile cues for proper body mechanics.   Pt will continue to benefit from skilled PT intervention. Medical Necessity is demonstrated by:  Unable to participate in daily activities    Patient is making Good progress towards established goals.    New/Revised Goals:remain appropriated     Plan:  Continue with established Plan of Care towards PT goals. PT/PTA face-to-face:discussed Pt's POC and progress towards established PT goals.  Louise Locke PTA  Gait trainin   Therex:50 minutes   Soft Tissue Mobs: 0     I have reviewed the therapist assistant's note and plan of care,  and I agree with the plan of care.     Kellie Stafford, JILLIANT   12/12/2017

## 2017-12-14 ENCOUNTER — CLINICAL SUPPORT (OUTPATIENT)
Dept: REHABILITATION | Facility: HOSPITAL | Age: 67
End: 2017-12-14
Attending: NEUROLOGICAL SURGERY
Payer: MEDICARE

## 2017-12-14 DIAGNOSIS — M54.40 CHRONIC LOW BACK PAIN WITH SCIATICA, SCIATICA LATERALITY UNSPECIFIED, UNSPECIFIED BACK PAIN LATERALITY: Primary | ICD-10-CM

## 2017-12-14 DIAGNOSIS — G89.29 CHRONIC LOW BACK PAIN WITH SCIATICA, SCIATICA LATERALITY UNSPECIFIED, UNSPECIFIED BACK PAIN LATERALITY: Primary | ICD-10-CM

## 2017-12-14 PROCEDURE — 97110 THERAPEUTIC EXERCISES: CPT

## 2017-12-18 ENCOUNTER — CLINICAL SUPPORT (OUTPATIENT)
Dept: REHABILITATION | Facility: HOSPITAL | Age: 67
End: 2017-12-18
Attending: NEUROLOGICAL SURGERY
Payer: MEDICARE

## 2017-12-18 DIAGNOSIS — M54.40 CHRONIC LOW BACK PAIN WITH SCIATICA, SCIATICA LATERALITY UNSPECIFIED, UNSPECIFIED BACK PAIN LATERALITY: Primary | ICD-10-CM

## 2017-12-18 DIAGNOSIS — G89.29 CHRONIC LOW BACK PAIN WITH SCIATICA, SCIATICA LATERALITY UNSPECIFIED, UNSPECIFIED BACK PAIN LATERALITY: Primary | ICD-10-CM

## 2017-12-18 PROCEDURE — 97110 THERAPEUTIC EXERCISES: CPT

## 2017-12-18 NOTE — PROGRESS NOTES
Physical Therapy Progress Note - Hip/Knee/Ankle/Spine  Diagnosis:           Encounter Diagnosis   Name Primary?    Chronic low back pain, unspecified back pain laterality, with sciatica presence unspecified Yes         Subjective:  Patient reports feeling fine. Pt reports 2/10 pain. Had pain of 8/10 on Saturday. Reports doing nothing out of the ordinary except maybe some excess bending. Pt received 1:1 care today.     Objective:  Treatment:   Pt received therapeutic exercises to improve ROM, strength, flexibility and proprioception such as:   UBE for spine muscles and up right posture - scapula retraction/protraction 3 minutes   Standing:gastroc stretch 1 minute   Long sitting: HS stretch 1 minutes each LE   LTR and BKTC using theraball 2 minutes each   Hook lying TA activation 10 reps hold 5 sec   Bridges with ball between knees 10 reps   SL clam shells and reverse clam shells 2 x 10 reps   SL hip abduction with min A from PT 2 x 10 reps   Short sitting:piriforms stretch stretch 2 x 30 sec   ROM:not tested today.     Written Home Exercises Provided:no changes made.   Instructed pt. regarding: Proper technique with all exercises. Pt demonstrated good understanding of the education provided. No cultural, environmental, or spiritual barriers identified to treatment or learning.    Assessment:Pt with good tolerance to PT today, presents weak hip, core and lower back muscles weak. Pt needs Luz and tactile cues for proper body mechanics. Would benefit from progression of strengthening in gluteals in order to continue increasing strength.  Pt will continue to benefit from skilled PT intervention. Medical Necessity is demonstrated by:  Unable to participate in daily activities    Patient is making Good progress towards established goals.    New/Revised Goals:remain appropriated     Plan:  Continue with established Plan of Care towards PT goals. PT/PTA face-to-face:discussed Pt's POC and progress towards established PT  goals.  Kellie Stafford, PT  Therex:50 minutes   12/18/2017

## 2017-12-19 NOTE — PROGRESS NOTES
Physical Therapy Progress Note - Hip/Knee/Ankle/Spine  Diagnosis:           Encounter Diagnosis   Name Primary?    Chronic low back pain, unspecified back pain laterality, with sciatica presence unspecified Yes         Subjective:  Patient reports feeling fine and did not get very sore after last PT visit. Pt received 1:1 care today.     Objective:  Treatment:   Pt received therapeutic exercises to improve ROM, strength, flexibility and proprioception such as:   UBE for spine muscles and up right posture - scapula retraction/protraction 3 minutes   Standing:gastroc stretch 1 minute   Long sitting: HS stretch 1 minutes each LE   LTR and BKTC using theraball 2 minutes each   Hook lying TA activation 10 reps hold 5 sec   Bridges with ball between knees 10 reps   SL clam shells and reverse clam shells 2 x 10 reps   SL hip abduction with min A from PTA 2 x 10 reps   Short sitting:piriforms stretch stretch 2 x 30 sec   ROM:not tested today.     Written Home Exercises Provided:no changes made.   Instructed pt. regarding: Proper technique with all exercises. Pt demonstrated good understanding of the education provided. No cultural, environmental, or spiritual barriers identified to treatment or learning.    Assessment:Pt with good tolerance to PT today, presents weak hip, core and lower back muscles weak. Pt needs Luz and tactile cues for proper body mechanics.   Pt will continue to benefit from skilled PT intervention. Medical Necessity is demonstrated by:  Unable to participate in daily activities    Patient is making Good progress towards established goals.    New/Revised Goals:remain appropriated     Plan:  Continue with established Plan of Care towards PT goals.   Louise Locke PTA  Gait trainin   Therex:50 minutes   Soft Tissue Mobs: 0

## 2017-12-20 ENCOUNTER — CLINICAL SUPPORT (OUTPATIENT)
Dept: REHABILITATION | Facility: HOSPITAL | Age: 67
End: 2017-12-20
Attending: NEUROLOGICAL SURGERY
Payer: MEDICARE

## 2017-12-20 DIAGNOSIS — M54.40 CHRONIC LOW BACK PAIN WITH SCIATICA, SCIATICA LATERALITY UNSPECIFIED, UNSPECIFIED BACK PAIN LATERALITY: Primary | ICD-10-CM

## 2017-12-20 DIAGNOSIS — G89.29 CHRONIC LOW BACK PAIN WITH SCIATICA, SCIATICA LATERALITY UNSPECIFIED, UNSPECIFIED BACK PAIN LATERALITY: Primary | ICD-10-CM

## 2017-12-20 PROCEDURE — 97110 THERAPEUTIC EXERCISES: CPT

## 2017-12-20 NOTE — PROGRESS NOTES
Physical Therapy Progress Note - Hip/Knee/Ankle/Spine  Diagnosis:           Encounter Diagnosis   Name Primary?    Chronic low back pain, unspecified back pain laterality, with sciatica presence unspecified Yes         Subjective:  Patient reports feeling fine. Pt reports 2/10 pain and feeling better than at last visit aside from some leg pain she had when out shopping yesterday.    Objective:  Treatment:   Pt received therapeutic exercises to improve ROM, strength, flexibility and proprioception such as:   UBE for spine muscles and up right posture - scapula retraction/protraction 3 minutes   Standing:gastroc stretch 2x1 minute   Long sitting: HS stretch 1 minutes each LE   LTR and BKTC using theraball 2 minutes each   Hook lying TA activation 2x15 reps hold 5 sec   Bridges with ball between knees 2x10 reps   SL clam shells and reverse clam shells 2 x 10 reps   SL hip abduction with min A from PT 2 x 10 reps   Short sitting:piriforms stretch stretch 2 x 30 sec   ROM:not tested today.     Written Home Exercises Provided:no changes made.   Instructed pt. regarding: Proper technique with all exercises. Pt demonstrated good understanding of the education provided. No cultural, environmental, or spiritual barriers identified to treatment or learning.    Assessment:Pt with good tolerance to PT today, presents weak hip, core and lower back muscles weak. Pt needs Luz and tactile cues for proper body mechanics. Was able to complete 2 sets of bridges today without an increase in pain. Would benefit from continued progression of strengthening in gluteals in order to continue increasing strength.  Pt will continue to benefit from skilled PT intervention. Medical Necessity is demonstrated by:  Unable to participate in daily activities    Patient is making Good progress towards established goals.    New/Revised Goals:remain appropriated     Plan:  Continue with established Plan of Care towards PT goals. PT/PTA  face-to-face:discussed Pt's POC and progress towards established PT goals.  Kellie Stafford, PT  Therex:50 minutes   12/20/2017

## 2017-12-27 ENCOUNTER — CLINICAL SUPPORT (OUTPATIENT)
Dept: REHABILITATION | Facility: HOSPITAL | Age: 67
End: 2017-12-27
Attending: NEUROLOGICAL SURGERY
Payer: MEDICARE

## 2017-12-27 DIAGNOSIS — M54.40 CHRONIC LOW BACK PAIN WITH SCIATICA, SCIATICA LATERALITY UNSPECIFIED, UNSPECIFIED BACK PAIN LATERALITY: Primary | ICD-10-CM

## 2017-12-27 DIAGNOSIS — G89.29 CHRONIC LOW BACK PAIN WITH SCIATICA, SCIATICA LATERALITY UNSPECIFIED, UNSPECIFIED BACK PAIN LATERALITY: Primary | ICD-10-CM

## 2017-12-27 PROCEDURE — 97110 THERAPEUTIC EXERCISES: CPT

## 2017-12-28 ENCOUNTER — CLINICAL SUPPORT (OUTPATIENT)
Dept: REHABILITATION | Facility: HOSPITAL | Age: 67
End: 2017-12-28
Attending: NEUROLOGICAL SURGERY
Payer: MEDICARE

## 2017-12-28 DIAGNOSIS — G89.29 CHRONIC LOW BACK PAIN WITH SCIATICA, SCIATICA LATERALITY UNSPECIFIED, UNSPECIFIED BACK PAIN LATERALITY: Primary | ICD-10-CM

## 2017-12-28 DIAGNOSIS — M54.40 CHRONIC LOW BACK PAIN WITH SCIATICA, SCIATICA LATERALITY UNSPECIFIED, UNSPECIFIED BACK PAIN LATERALITY: Primary | ICD-10-CM

## 2017-12-28 PROCEDURE — 97110 THERAPEUTIC EXERCISES: CPT

## 2017-12-28 PROCEDURE — G8979 MOBILITY GOAL STATUS: HCPCS | Mod: CK

## 2017-12-28 PROCEDURE — G8978 MOBILITY CURRENT STATUS: HCPCS | Mod: CK

## 2017-12-28 NOTE — PROGRESS NOTES
"Physical Therapy Progress Note - Hip/Knee/Ankle/Spine  Diagnosis:           Encounter Diagnosis   Name Primary?    Chronic low back pain, unspecified back pain laterality, with sciatica presence unspecified Yes         Subjective: Patient denied low back pain this morning. Stated she had increased low back pain on [place] because she was assisting her mother more.     Objective:  Treatment:   Pt received therapeutic exercises to improve ROM, strength, flexibility and proprioception such as:     One on one ther ex x 27 minutes    UBE for spine muscles and up right posture - scapula retraction/protraction 3 minutes   Standing:gastroc stretch 2 x 1 minute   Long sitting: HS stretch 1 minutes each LE   LTR and BKTC using theraball 2 minutes each   Hook lying TA activation 2 x 15 x 5"    Bridges with ball between knees 2 x 10 reps   SL clam shells and reverse clam shells 2 x 10 reps   SL hip abduction with min A from PT 2 x 10 reps   Short sitting:piriforms stretch stretch 2 x 30 sec     Moist heat to low back x 10'    Written Home Exercises Provided:  No new HEP given today.    Assessment:    Verbal cues for proper technique for TA bracing today.    Pt will continue to benefit from skilled PT intervention. Medical Necessity is demonstrated by:  Unable to participate in daily activities    Patient is making Good progress towards established goals.    New/Revised Goals:remain appropriated     Plan: Continue with established Plan of Care towards PT goals.       "

## 2017-12-28 NOTE — PROGRESS NOTES
Physical Therapy Progress Note - Hip/Knee/Ankle/Spine  Diagnosis:           Encounter Diagnosis   Name Primary?    Chronic low back pain, unspecified back pain laterality, with sciatica presence unspecified Yes         Subjective:  Patient reports Having increase of pain over the holiday secondary to moving her mother in bed. Pt states that had to cancel her appointment yesterday secondary to pain. Pt reports feeling better but does not verbalize a pain number.     Objective:  Treatment:   Pt received therapeutic exercises to improve ROM, strength, flexibility and proprioception such as:   UBE for spine muscles and up right posture - scapula retraction/protraction 3 minutes   Standing:gastroc stretch 2x1 minute   Long sitting: HS stretch 1 minutes each LE   LTR and BKTC using theraball 2 minutes each   Hook lying TA activation 2x15 reps hold 5 sec   Bridges with ball between knees 2x10 reps   SL clam shells and reverse clam shells 2 x 10 reps   SL hip abduction with min A from PT 2 x 10 reps   Short sitting:piriforms stretch stretch 2 x 30 sec   ROM:not tested today.     Written Home Exercises Provided:no changes made.   Instructed pt. regarding: Proper technique with all exercises. Pt demonstrated good understanding of the education provided. No cultural, environmental, or spiritual barriers identified to treatment or learning.    Assessment:Pt with good tolerance to PT today, able to tolerated therex without increase of pain. PTA educated Pt on how to proper roll and transfer her mother in bed safety for her lower back. Pt verbally understood.  Pt will continue to benefit from skilled PT intervention. Medical Necessity is demonstrated by:  Unable to participate in daily activities    Patient is making Good progress towards established goals.    New/Revised Goals:remain appropriated     Plan:  Continue with established Plan of Care towards PT goals.   Therex:50 minutes

## 2018-01-03 ENCOUNTER — CLINICAL SUPPORT (OUTPATIENT)
Dept: REHABILITATION | Facility: HOSPITAL | Age: 68
End: 2018-01-03
Attending: NEUROLOGICAL SURGERY
Payer: MEDICARE

## 2018-01-03 DIAGNOSIS — G89.29 CHRONIC LOW BACK PAIN WITH SCIATICA, SCIATICA LATERALITY UNSPECIFIED, UNSPECIFIED BACK PAIN LATERALITY: Primary | ICD-10-CM

## 2018-01-03 DIAGNOSIS — M54.40 CHRONIC LOW BACK PAIN WITH SCIATICA, SCIATICA LATERALITY UNSPECIFIED, UNSPECIFIED BACK PAIN LATERALITY: Primary | ICD-10-CM

## 2018-01-03 PROCEDURE — 97110 THERAPEUTIC EXERCISES: CPT

## 2018-01-03 NOTE — PROGRESS NOTES
"Physical Therapy Progress Note - Hip/Knee/Ankle/Spine  Diagnosis:           Encounter Diagnosis   Name Primary?    Chronic low back pain, unspecified back pain laterality, with sciatica presence unspecified Yes         Subjective: Patient reports feeling better today, with less pain in her hips.     Objective:  Treatment:   Pt received therapeutic exercises to improve ROM, strength, flexibility and proprioception such as:     One on one ther ex x 27 minutes    UBE for spine muscles and up right posture - scapula retraction/protraction 3 minutes   Standing:gastroc stretch 2 x 1 minute   Long sitting: HS stretch 1 minutes each LE   LTR and BKTC using theraball 2 minutes each   Hook lying TA activation 2 x 15 x 5"    Bridges with ball between knees 2 x 10 reps   SL clam shells and reverse clam shells 2 x 10 reps   SL hip abduction with min A from PT 2 x 10 reps   Short sitting:piriforms stretch stretch 2 x 30 sec     Moist heat to low back x 10'    Written Home Exercises Provided:  No new HEP given today.    Assessment:    Verbal cues for proper technique for TA bracing today.    Pt will continue to benefit from skilled PT intervention. Medical Necessity is demonstrated by:  Unable to participate in daily activities    Patient is making Good progress towards established goals.    New/Revised Goals:remain appropriated     Plan: Continue with established Plan of Care towards PT goals.     Kellie Stafford, PT  01/04/2018      "

## 2018-01-24 ENCOUNTER — CLINICAL SUPPORT (OUTPATIENT)
Dept: REHABILITATION | Facility: HOSPITAL | Age: 68
End: 2018-01-24
Attending: NEUROLOGICAL SURGERY
Payer: MEDICARE

## 2018-01-24 DIAGNOSIS — G89.29 CHRONIC LOW BACK PAIN WITH SCIATICA, SCIATICA LATERALITY UNSPECIFIED, UNSPECIFIED BACK PAIN LATERALITY: Primary | ICD-10-CM

## 2018-01-24 DIAGNOSIS — M54.40 CHRONIC LOW BACK PAIN WITH SCIATICA, SCIATICA LATERALITY UNSPECIFIED, UNSPECIFIED BACK PAIN LATERALITY: Primary | ICD-10-CM

## 2018-01-24 PROCEDURE — G8979 MOBILITY GOAL STATUS: HCPCS | Mod: CK

## 2018-01-24 PROCEDURE — G8978 MOBILITY CURRENT STATUS: HCPCS | Mod: CK

## 2018-01-24 PROCEDURE — 97110 THERAPEUTIC EXERCISES: CPT

## 2018-01-24 PROCEDURE — 97164 PT RE-EVAL EST PLAN CARE: CPT
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
## 2018-01-30 ENCOUNTER — CLINICAL SUPPORT (OUTPATIENT)
Dept: REHABILITATION | Facility: HOSPITAL | Age: 68
End: 2018-01-30
Attending: NEUROLOGICAL SURGERY
Payer: MEDICARE

## 2018-01-30 DIAGNOSIS — M54.40 CHRONIC LOW BACK PAIN WITH SCIATICA, SCIATICA LATERALITY UNSPECIFIED, UNSPECIFIED BACK PAIN LATERALITY: Primary | ICD-10-CM

## 2018-01-30 DIAGNOSIS — G89.29 CHRONIC LOW BACK PAIN WITH SCIATICA, SCIATICA LATERALITY UNSPECIFIED, UNSPECIFIED BACK PAIN LATERALITY: Primary | ICD-10-CM

## 2018-01-30 PROCEDURE — 97113 AQUATIC THERAPY/EXERCISES: CPT

## 2018-01-30 NOTE — PROGRESS NOTES
"                                                                                                                         Aquatic Progress Note    Total treatment time: 60    Time In: 3:00  Time Out: 4:05      Subjective  Sunni states "that she is w/o LBP at this time.       Objective    Treatment: Sunni was instructed in and performed therapeutic exercises to develop strength, endurance, ROM, flexibility, balance, posture and core stabilization for 60 minutes,on this date . Patient performed therapeutic exercises consisting of warm up laps without resistance, PROM/Stretching, UE strengthening, LE strengthening, lumbar stablization, balance exercises, isometrics, Endurance, march, theraband and cool down.      Warm up laps fwd/bwd/side 3 laps each    HSS 3x20"  Quad str 3x20"     Lower extremity exs: 20x each  -Mini squats with quad set  -Heel raise with glut set  -Hip flx with laq  -Hip ext with hs curl  -Lunges front/side    Upper extremity exs: 20 reps each  -shld flx/ext  -shld horiz abd/add  -Lat pull ytb  -shld rows ytc     Endurance 3 min  Marching:   Bicycling:(NP)     Cool down laps fwd/bwd/side 1 lap each     Patient was not issued HEP for pool.      Assessment  Patient's tolerance to treatment with  ther ex was good. Pt with good recall of core stab tech, proper posture, requiring few VCs for proper form post instruction. This is a 67 y.o. female referred to outpatient physical therapy and presents with a medical diagnosis of low back pain and demonstrates limitations as described in the problem list. Pt will benefit from physical therapy services in order to maximize pain free functional independence.  Patient will continue to benefit from skilled PT intervention.    Sunni is making good progress towards established goals.      Plan  Continue 2x per week.  "

## 2018-02-01 ENCOUNTER — CLINICAL SUPPORT (OUTPATIENT)
Dept: REHABILITATION | Facility: HOSPITAL | Age: 68
End: 2018-02-01
Attending: NEUROLOGICAL SURGERY
Payer: MEDICARE

## 2018-02-01 DIAGNOSIS — G89.29 CHRONIC LOW BACK PAIN WITH SCIATICA, SCIATICA LATERALITY UNSPECIFIED, UNSPECIFIED BACK PAIN LATERALITY: Primary | ICD-10-CM

## 2018-02-01 DIAGNOSIS — M54.40 CHRONIC LOW BACK PAIN WITH SCIATICA, SCIATICA LATERALITY UNSPECIFIED, UNSPECIFIED BACK PAIN LATERALITY: Primary | ICD-10-CM

## 2018-02-01 PROCEDURE — 97113 AQUATIC THERAPY/EXERCISES: CPT

## 2018-02-01 NOTE — PROGRESS NOTES
"                                                                                                                         Aquatic Progress Note    Total treatment time: 60    Time In: 09:25  Time Out: 1030       Subjective  Sunni states she is not having the stabbing pan in the mid spine today. Continues to have some pain in the left shoulder and low spine.       Objective    Treatment: Sunni was instructed in and performed therapeutic exercises to develop strength, endurance, ROM, flexibility, balance, posture and core stabilization for 60 minutes,on this date . Patient performed therapeutic exercises consisting of warm up laps without resistance, PROM/Stretching, UE strengthening, LE strengthening, lumbar stablization, balance exercises, isometrics, Endurance, march, theraband and cool down.      Warm up laps fwd/bwd/side 3 laps each    HSS 3x20"  Quad str 3x20"     Lower extremity exs: 20x each  -Mini squats with quad set  -Heel raise with glut set  -Hip flx with laq  -Hip ext with hs curl  -Lunges front/side  - Thoracic Rotation with noodle x 2 min    Upper extremity exs: 20 reps each  -shld flx/ext  -shld horiz abd/add  -Lat pull ytb  -shld rows ytc     Endurance 3 min  Marching:   Bicycling:(NP)     Cool down laps fwd/bwd/side 1 lap each     Patient was not issued HEP for pool.      Assessment  Pt tolerated tx well and seems to be progressing with aquatic therapy. Plan to progress as tolerated      This is a 67 y.o. female referred to outpatient physical therapy and presents with a medical diagnosis of low back pain and demonstrates limitations as described in the problem list. Pt will benefit from physical therapy services in order to maximize pain free functional independence.  Patient will continue to benefit from skilled PT intervention.    Sunni is making good progress towards established goals.      Plan  Continue 2x per week.  "

## 2018-02-05 ENCOUNTER — CLINICAL SUPPORT (OUTPATIENT)
Dept: REHABILITATION | Facility: HOSPITAL | Age: 68
End: 2018-02-05
Attending: NEUROLOGICAL SURGERY
Payer: MEDICARE

## 2018-02-05 DIAGNOSIS — M54.40 CHRONIC LOW BACK PAIN WITH SCIATICA, SCIATICA LATERALITY UNSPECIFIED, UNSPECIFIED BACK PAIN LATERALITY: Primary | ICD-10-CM

## 2018-02-05 DIAGNOSIS — G89.29 CHRONIC LOW BACK PAIN WITH SCIATICA, SCIATICA LATERALITY UNSPECIFIED, UNSPECIFIED BACK PAIN LATERALITY: Primary | ICD-10-CM

## 2018-02-05 PROCEDURE — 97113 AQUATIC THERAPY/EXERCISES: CPT

## 2018-02-06 NOTE — PROGRESS NOTES
"                                                                                                                         Aquatic Progress Note      Subjective  Sunni states having 2/10 pain today. Pt is 20 minutes late for her appointment.    Objective    Treatment: Sunni was instructed in and performed therapeutic exercises to develop strength, endurance, ROM, flexibility, balance, posture and core stabilization for 60 minutes,on this date . Patient performed therapeutic exercises consisting of warm up laps without resistance, PROM/Stretching, UE strengthening, LE strengthening, lumbar stablization, balance exercises, isometrics, Endurance, march, theraband and cool down.      Warm up laps fwd/bwd/side 3 laps each    HSS 3x20"  Quad str 3x20"     Lower extremity exs: 20x each  -Mini squats with quad set  -Heel raise with glut set  -Hip flx with laq  -Hip ext with hs curl  -Lunges front/side  - Thoracic Rotation with noodle x 2 min (NP)    Upper extremity exs: 20 reps each (NP)  -shld flx/ext  -shld horiz abd/add  -Lat pull ytb   -shld rows ytc     Endurance 3 min  Marching:   Bicycling:(NP)     Cool down laps fwd/bwd/side 1 lap each     Patient was not issued HEP for pool.      Assessment  Pt with good tolerance to PT today, no increase of pain during session. Pt needs visual cues for proper body mechanics.   This is a 67 y.o. female referred to outpatient physical therapy and presents with a medical diagnosis of low back pain and demonstrates limitations as described in the problem list. Pt will benefit from physical therapy services in order to maximize pain free functional independence.  Patient will continue to benefit from skilled PT intervention.    Sunni is making good progress towards established goals. PT/PTA face-to-face:discussed Pt's POC and progress towards established PT goals.  Louise Locke PTA    Plan  Continue 2x per week.  "

## 2018-02-08 ENCOUNTER — CLINICAL SUPPORT (OUTPATIENT)
Dept: REHABILITATION | Facility: HOSPITAL | Age: 68
End: 2018-02-08
Attending: NEUROLOGICAL SURGERY
Payer: MEDICARE

## 2018-02-08 DIAGNOSIS — M54.40 CHRONIC LOW BACK PAIN WITH SCIATICA, SCIATICA LATERALITY UNSPECIFIED, UNSPECIFIED BACK PAIN LATERALITY: Primary | ICD-10-CM

## 2018-02-08 DIAGNOSIS — G89.29 CHRONIC LOW BACK PAIN WITH SCIATICA, SCIATICA LATERALITY UNSPECIFIED, UNSPECIFIED BACK PAIN LATERALITY: Primary | ICD-10-CM

## 2018-02-08 PROCEDURE — 97113 AQUATIC THERAPY/EXERCISES: CPT

## 2018-02-08 NOTE — PROGRESS NOTES
"                                                                                                                         Aquatic Progress Note      Subjective  Sunni states having 3/10 pain today.     Objective    Treatment: Sunni was instructed in and performed therapeutic exercises to develop strength, endurance, ROM, flexibility, balance, posture and core stabilization for 60 minutes,on this date . Patient performed therapeutic exercises consisting of warm up laps without resistance, PROM/Stretching, UE strengthening, LE strengthening, lumbar stablization, balance exercises, isometrics, Endurance, march, theraband and cool down.      Warm up laps fwd/bwd/side 3 laps each    HSS 3x20"  Quad str 3x20"     Lower extremity exs: 30x each  -Mini squats with quad set  -Heel raise with glut set  -Hip flx with laq  -Hip ext with hs curl  -Lunges front/side      Upper extremity exs: 30 reps each   -shld flx/ext  -shld horiz abd/add  -Lat pull ytb NP  -shld rows ytc NP    Endurance 3 min  Marching:   Bicycling:(NP)     Cool down laps fwd/bwd/side 1 lap each     Patient was not issued HEP for pool.      Assessment  Pt with good tolerance to tx with progression  today, no increase of pain during session. Pt needs visual cues for proper body mechanics.   This is a 67 y.o. female referred to outpatient physical therapy and presents with a medical diagnosis of low back pain and demonstrates limitations as described in the problem list. Pt will benefit from physical therapy services in order to maximize pain free functional independence.  Patient will continue to benefit from skilled PT intervention.    Sunni is making good progress towards established goals. PT/PTA face-to-face:discussed Pt's POC and progress towards established PT goals.  Al Saenz, PTA    Plan  Continue 2x per week.  "

## 2018-02-15 ENCOUNTER — CLINICAL SUPPORT (OUTPATIENT)
Dept: REHABILITATION | Facility: HOSPITAL | Age: 68
End: 2018-02-15
Attending: NEUROLOGICAL SURGERY
Payer: MEDICARE

## 2018-02-15 DIAGNOSIS — G89.29 CHRONIC LOW BACK PAIN WITH SCIATICA, SCIATICA LATERALITY UNSPECIFIED, UNSPECIFIED BACK PAIN LATERALITY: Primary | ICD-10-CM

## 2018-02-15 DIAGNOSIS — M54.40 CHRONIC LOW BACK PAIN WITH SCIATICA, SCIATICA LATERALITY UNSPECIFIED, UNSPECIFIED BACK PAIN LATERALITY: Primary | ICD-10-CM

## 2018-02-15 PROCEDURE — 97113 AQUATIC THERAPY/EXERCISES: CPT

## 2018-02-15 NOTE — PROGRESS NOTES
"                                                                                                                         Aquatic Progress Note      Subjective  Sunni is w/o c/o LBP today, however reports  3/10 upper back pain today.     Objective    Treatment: Sunni was instructed in and performed therapeutic exercises to develop strength, endurance, ROM, flexibility, balance, posture and core stabilization for 60 minutes,on this date . Patient performed therapeutic exercises consisting of warm up laps without resistance, PROM/Stretching, UE strengthening, LE strengthening, lumbar stablization, balance exercises, isometrics, Endurance, march, theraband and cool down.      Warm up laps fwd/bwd/side 3 laps each    HSS 3x20"  Quad str 3x20"     Lower extremity exs: 30x each 2#  -Mini squats with quad set  -Heel raise with glut set  -Hip flx with laq  -Hip ext with hs curl  -Lunges front/side      Upper extremity exs: 30 reps each   -shld flx/ext APO  -shld horiz abd/add APO  -Lat pull ytb NP  -shld rows ytc NP    Endurance 4 min  Marching:   Bicycling:(NP)     Cool down laps fwd/bwd/side 1 lap each     Patient was not issued HEP for pool.      Assessment  Pt with good tolerance to tx with progression  today, no increase of pain during session. Pt needs visual cues for proper body mechanics.   This is a 67 y.o. female referred to outpatient physical therapy and presents with a medical diagnosis of low back pain and demonstrates limitations as described in the problem list. Pt will benefit from physical therapy services in order to maximize pain free functional independence.  Patient will continue to benefit from skilled PT intervention.    Sunni is making good progress towards established goals. PT/PTA face-to-face:discussed Pt's POC and progress towards established PT goals.  Al Saenz, PTA    Plan  Pt to be reassessed by PT.  "

## 2018-02-21 ENCOUNTER — CLINICAL SUPPORT (OUTPATIENT)
Dept: REHABILITATION | Facility: HOSPITAL | Age: 68
End: 2018-02-21
Attending: NEUROLOGICAL SURGERY
Payer: MEDICARE

## 2018-02-21 DIAGNOSIS — G89.29 CHRONIC LOW BACK PAIN WITH SCIATICA, SCIATICA LATERALITY UNSPECIFIED, UNSPECIFIED BACK PAIN LATERALITY: Primary | ICD-10-CM

## 2018-02-21 DIAGNOSIS — M54.40 CHRONIC LOW BACK PAIN WITH SCIATICA, SCIATICA LATERALITY UNSPECIFIED, UNSPECIFIED BACK PAIN LATERALITY: Primary | ICD-10-CM

## 2018-02-21 PROCEDURE — 97110 THERAPEUTIC EXERCISES: CPT

## 2018-02-21 PROCEDURE — G8978 MOBILITY CURRENT STATUS: HCPCS | Mod: CK

## 2018-02-21 PROCEDURE — G8979 MOBILITY GOAL STATUS: HCPCS | Mod: CK

## 2018-02-21 NOTE — PROGRESS NOTES
Physical Therapy Progress Note - Hip/Knee/Ankle/Spine  Diagnosis:           Encounter Diagnosis   Name Primary?    Chronic low back pain, unspecified back pain laterality, with sciatica presence unspecified Yes          Subjective:   Pt has had 5 pool visits. She is also now seeing a chiropractor. She has increased pain in her right arm with horizontal abduction. Is finding that ice treats her better than heat lately. The MRI of her brain was completed, but she does not remember the results aside from that she had no hemorrhaging Is to go to a neurologist to look at the cause of her sensation of heat in the side of her neck. Her back pain is significantly improved. She still gets a stabbing feeling every now and then but not as often. Her low back pain is dependent on her activity level. Was instructed by her chiropractor not to lift more than 5 lbs.     Pain level is 7/10.    Objective:  Treatment:   Pt received therapeutic exercises to improve ROM, strength, flexibility and proprioception such as:     One on one ther ex x 20 minutes    Standing:gastroc stretch 2 x 1 minute   Long sitting: HS stretch 1 minutes each LE   LTR and BKTC using theraball 2 minutes each   Short sitting:piriforms stretch 2 x 30 sec     Lumbar Range of Motion:     Degrees Pain   Flexion 60 degrees  (60 is norm) Dull pain in low back with bending forward   Extension 30 degrees  (25 is norm) No pain, just a stretch in the quads   Left Side Bending 25 degrees  (25 is norm) Pulling pain in R low back, ttp   Right Side Bending 25 degrees  (25 is norm) Sharp right sided low back pain    Left Rotation 100% -   Right Rotation 100% Mild pain      Cervical AROM screen: WNL all directions, but with bilateral upper trap pain with flexion and extension     Lower Extremity Strength  Right LE   Left LE     Knee extension: 5/5 Knee extension: 4+/5   Knee flexion: 5/5 Knee flexion: 5/5   Hip flexion: 5/5 Hip flexion: *painful 4/5   Hip extension:  4/5 Hip  extension: 4/5   Hip abduction: 4+/5 Hip abduction: 4+/5   Hip adduction: 5/5 Hip adduction 5/5   Ankle dorsiflexion: 4+/5 Ankle dorsiflexion: 4+/5   Ankle plantarflexion: 5/5 Ankle plantarflexion: 5/5         Special Tests:  -SlumpTest: -  -Bridge Test: +    Written Home Exercises Provided:  No new HEP given today.    Assessment:    Improvements in range of motion and strength were noted today, but pt remains limited. Activity tolerance continues to be diminished today and at this time and pt would benefit from continuing strengthening in aquatic therapy until her tolerance improved and she is able to re-progress to land therapy.     Pt will continue to benefit from skilled PT intervention. Medical Necessity is demonstrated by:  Unable to participate in daily activities    Patient is making Good progress towards established goals.    New/Revised Goals: see below     Plan:   GOALS: Short Term Goals: 3 weeks  1. Report decreased low back pain  <   / =  6  /10  to increase tolerance for completion of ADLs  2. Pt to increase B side bending AROM by > or = 10 degrees in order to improve flexibility.   3. Increased MMT  for  hip extension to 4+/5 bilaterally to increase tolerance for ADL and work activities.  4. Pt to achieve 60 degrees of active lumbar flexion with decreased pain in order to demonstrate improvements in mobility.    5. Pt to tolerate HEP to improve ROM and independence with ADL's MET:2/21/2018     Long Term Goals: 6 weeks  1. Report decreased low back pain  <   / =  3 /10  to increase tolerance for completion of ADLs  2. Pt to increase B popliteal angle by > or = 20 degrees in order to improve flexibility and posture.   3. Increased MMT  for  hip extension to 5/5 bilaterally to increase tolerance for ADL and work activities.  4. Pt will report at CJ level (20-40% impaired) on FOTO score for low back pain disability to demonstrate decrease in disability and improvement in back pain.  5. Pt to be  Independent with HEP to improve ROM and independence with ADL's  6. Pt to demonstrate negative Bridge Test in order to show improved core strength for lumbar stabilization.      Plan      Pt will be treated by physical therapy 1-3 times a week for 6 weeks for Pt Education, HEP, therapeutic exercises, neuromuscular re-education, joint mobilizations, modalities prn to achieve established goals. Sunni may at times be seen by a PTA as part of the Rehab Team.      Cont PT for 6 weeks.      Kellie Stafford, DPT  1/24/2018     I certify the need for these services furnished under this plan of treatment and while under my care.     ______________________________ Physician/Referring Practitioner  Date of Signature

## 2018-02-27 ENCOUNTER — CLINICAL SUPPORT (OUTPATIENT)
Dept: REHABILITATION | Facility: HOSPITAL | Age: 68
End: 2018-02-27
Attending: NEUROLOGICAL SURGERY
Payer: MEDICARE

## 2018-02-27 DIAGNOSIS — G89.29 CHRONIC LOW BACK PAIN WITH SCIATICA, SCIATICA LATERALITY UNSPECIFIED, UNSPECIFIED BACK PAIN LATERALITY: Primary | ICD-10-CM

## 2018-02-27 DIAGNOSIS — M54.40 CHRONIC LOW BACK PAIN WITH SCIATICA, SCIATICA LATERALITY UNSPECIFIED, UNSPECIFIED BACK PAIN LATERALITY: Primary | ICD-10-CM

## 2018-02-27 PROCEDURE — 97113 AQUATIC THERAPY/EXERCISES: CPT

## 2018-02-27 NOTE — PROGRESS NOTES
"                                                                                                                         Aquatic Progress Note      Subjective  Sunni reports 4/10 LBP today.Pt reports that she had increased upper back pn post last tx that she attributes to horiz ABD exs.    Objective    Treatment: Sunni was instructed in and performed therapeutic exercises to develop strength, endurance, ROM, flexibility, balance, posture and core stabilization for 60 minutes,on this date . Patient performed therapeutic exercises consisting of warm up laps without resistance, PROM/Stretching, UE strengthening, LE strengthening, lumbar stablization, balance exercises, isometrics, Endurance, march, theraband and cool down.      Warm up laps fwd/bwd/side 3 laps each    HSS 3x20"  Quad str 3x20"     Lower extremity exs: 30x each 2#  -Mini squats with quad set  -Heel raise with glut set  -Hip flx with laq  -Hip ext with hs curl  -Lunges front/side      Upper extremity exs: 30 reps each   -shld flx/ext   -shld horiz abd/add   -Lat pull ytb NP  -shld rows ytc NP    Endurance 4 min  Marching:   Bicycling:(NP)     Cool down laps fwd/bwd/side 1 lap each     Patient was not issued HEP for pool.      Assessment  Pt with good tolerance to tx with ther ex today, no Aqua paddles this session with UE exs due to pt report of pn post last tx. Pt needs visual cues for proper body mechanics.   This is a 67 y.o. female referred to outpatient physical therapy and presents with a medical diagnosis of low back pain and demonstrates limitations as described in the problem list. Pt will benefit from physical therapy services in order to maximize pain free functional independence.  Patient will continue to benefit from skilled PT intervention.    Sunni is making good progress towards established goals. PT/PTA face-to-face:discussed Pt's POC and progress towards established PT goals.  Al Saenz, PTA    Plan  Pt to be reassessed by " PT.

## 2018-03-01 ENCOUNTER — CLINICAL SUPPORT (OUTPATIENT)
Dept: REHABILITATION | Facility: HOSPITAL | Age: 68
End: 2018-03-01
Attending: NEUROLOGICAL SURGERY
Payer: MEDICARE

## 2018-03-01 DIAGNOSIS — M54.40 CHRONIC LOW BACK PAIN WITH SCIATICA, SCIATICA LATERALITY UNSPECIFIED, UNSPECIFIED BACK PAIN LATERALITY: Primary | ICD-10-CM

## 2018-03-01 DIAGNOSIS — G89.29 CHRONIC LOW BACK PAIN WITH SCIATICA, SCIATICA LATERALITY UNSPECIFIED, UNSPECIFIED BACK PAIN LATERALITY: Primary | ICD-10-CM

## 2018-03-01 PROCEDURE — 97113 AQUATIC THERAPY/EXERCISES: CPT

## 2018-03-01 NOTE — PROGRESS NOTES
"                                                                                                                         Aquatic Progress Note      Subjective  Sunni reports 0/10 LBP today. but reports 3/10 R hip pn.Pt states that overall she is feeling better with decreased pn sx and increased rajwinder to ADLS    Objective    Treatment: Sunni was instructed in and performed therapeutic exercises to develop strength, endurance, ROM, flexibility, balance, posture and core stabilization for 60 minutes,on this date . Patient performed therapeutic exercises consisting of warm up laps without resistance, PROM/Stretching, UE strengthening, LE strengthening, lumbar stablization, balance exercises, isometrics, Endurance, march, theraband and cool down.      Warm up laps fwd/bwd/side 3 laps each    HSS 3x20"  Quad str 3x20"     Lower extremity exs: 30x each 2.5#  -Mini squats with quad set  -Heel raise with glut set  -Hip flx with laq  -Hip ext with hs curl  -Lunges front/side      Upper extremity exs: 30 reps each   -shld flx/ext   -shld horiz abd/add   -Lat pull ytb NP  -shld rows ytc NP    Endurance 5 min  Marching:   Bicycling:(NP)     Cool down laps fwd/bwd/side 1 lap each     Patient was not issued HEP for pool.      Assessment  Pt with good tolerance to tx with progression of  ther ex today,  Pt needs visual cues for proper body mechanics.   This is a 67 y.o. female referred to outpatient physical therapy and presents with a medical diagnosis of low back pain and demonstrates limitations as described in the problem list. Pt will benefit from physical therapy services in order to maximize pain free functional independence.  Patient will continue to benefit from skilled PT intervention.    Sunni is making good progress towards established goals. PT/PTA face-to-face:discussed Pt's POC and progress towards established PT goals.  Al Saenz, PTA    Plan  Pt to be reassessed by PT.  "

## 2018-03-08 ENCOUNTER — CLINICAL SUPPORT (OUTPATIENT)
Dept: REHABILITATION | Facility: HOSPITAL | Age: 68
End: 2018-03-08
Attending: NEUROLOGICAL SURGERY
Payer: MEDICARE

## 2018-03-08 DIAGNOSIS — G89.29 CHRONIC LOW BACK PAIN WITH SCIATICA, SCIATICA LATERALITY UNSPECIFIED, UNSPECIFIED BACK PAIN LATERALITY: Primary | ICD-10-CM

## 2018-03-08 DIAGNOSIS — M54.40 CHRONIC LOW BACK PAIN WITH SCIATICA, SCIATICA LATERALITY UNSPECIFIED, UNSPECIFIED BACK PAIN LATERALITY: Primary | ICD-10-CM

## 2018-03-08 PROCEDURE — 97113 AQUATIC THERAPY/EXERCISES: CPT

## 2018-03-08 NOTE — PROGRESS NOTES
"                                                                                                                         Aquatic Progress Note      Subjective  Sunni reports 0/10 LBP today. Pt states that overall she is feeling better today with decreased pn sx and increased rajwinder to ADLS    Objective    Treatment: Sunni was instructed in and performed therapeutic exercises to develop strength, endurance, ROM, flexibility, balance, posture and core stabilization for 60 minutes,on this date . Patient performed therapeutic exercises consisting of warm up laps without resistance, PROM/Stretching, UE strengthening, LE strengthening, lumbar stablization, balance exercises, isometrics, Endurance, march, theraband and cool down.      Warm up laps fwd/bwd/side 3 laps each    HSS 3x20"  Quad str 3x20"     Lower extremity exs: 30x each 2.5#  -Mini squats with quad set  -Heel raise with glut set  -Hip flx with laq  -Hip ext with hs curl  -Lunges front/side      Upper extremity exs: 30 reps each   -shld flx/ext   -shld horiz abd/add   -Lat pull ytb NP  -shld rows ytc NP    Endurance 6 min  Marching:   Bicycling:(NP)     Cool down laps fwd/bwd/side 1 lap each     Patient was not issued HEP for pool.      Assessment  Pt with good tolerance to tx with  ther ex today,  Pt needs visual cues for proper body mechanics.   This is a 67 y.o. female referred to outpatient physical therapy and presents with a medical diagnosis of low back pain and demonstrates limitations as described in the problem list. Pt will benefit from physical therapy services in order to maximize pain free functional independence.  Patient will continue to benefit from skilled PT intervention.    Sunni is making good progress towards established goals. PT/PTA face-to-face:discussed Pt's POC and progress towards established PT goals.  Al Saenz, PTA    Plan  Pt to be reassessed by PT.  "

## 2018-03-13 ENCOUNTER — CLINICAL SUPPORT (OUTPATIENT)
Dept: REHABILITATION | Facility: HOSPITAL | Age: 68
End: 2018-03-13
Attending: NEUROLOGICAL SURGERY
Payer: MEDICARE

## 2018-03-13 DIAGNOSIS — M54.40 CHRONIC LOW BACK PAIN WITH SCIATICA, SCIATICA LATERALITY UNSPECIFIED, UNSPECIFIED BACK PAIN LATERALITY: Primary | ICD-10-CM

## 2018-03-13 DIAGNOSIS — G89.29 CHRONIC LOW BACK PAIN WITH SCIATICA, SCIATICA LATERALITY UNSPECIFIED, UNSPECIFIED BACK PAIN LATERALITY: Primary | ICD-10-CM

## 2018-03-13 PROCEDURE — 97113 AQUATIC THERAPY/EXERCISES: CPT

## 2018-03-13 NOTE — PROGRESS NOTES
"                                                                                                                         Aquatic Progress Note      Subjective  Sunni reports 3/10 LBP, 5/10 R hip pn today. Pt states that overall she is feeling better today with decreased pn sx and increased rajwinder to ADLS    Objective    Treatment: Sunni was instructed in and performed therapeutic exercises to develop strength, endurance, ROM, flexibility, balance, posture and core stabilization for 60 minutes,on this date . Patient performed therapeutic exercises consisting of warm up laps without resistance, PROM/Stretching, UE strengthening, LE strengthening, lumbar stablization, balance exercises, isometrics, Endurance, march, theraband and cool down.      Warm up laps fwd/bwd/side 3 laps each    HSS 3x20"  Quad str 3x20"     Lower extremity exs: 30x each 2.5#  -Mini squats with quad set  -Heel raise with glut set  -Hip flx with laq  -Hip ext with hs curl  -Lunges front/side      Upper extremity exs: 30 reps each   -shld flx/ext   -shld horiz abd/add   -Lat pull ytb   -shld rows ytc NP    Endurance 6 min  Marching:   Bicycling:(NP)     Cool down laps fwd/bwd/side 1 lap each     Patient was not issued HEP for pool.      Assessment  Pt with good tolerance to tx with progression of  ther ex today,  Pt with increased rajwinder to UE exs today, resuming Lat pull with resistance w/o difficulty.  Pt needs visual cues for proper body mechanics.   This is a 67 y.o. female referred to outpatient physical therapy and presents with a medical diagnosis of low back pain and demonstrates limitations as described in the problem list. Pt will benefit from physical therapy services in order to maximize pain free functional independence.  Patient will continue to benefit from skilled PT intervention.    Sunni is making good progress towards established goals. PT/PTA face-to-face:discussed Pt's POC and progress towards established PT goals.  Al " SANAM Saenz    Plan  Continue tx 2x week per POC.

## 2018-03-15 ENCOUNTER — CLINICAL SUPPORT (OUTPATIENT)
Dept: REHABILITATION | Facility: HOSPITAL | Age: 68
End: 2018-03-15
Attending: NEUROLOGICAL SURGERY
Payer: MEDICARE

## 2018-03-15 DIAGNOSIS — G89.29 CHRONIC LOW BACK PAIN WITH SCIATICA, SCIATICA LATERALITY UNSPECIFIED, UNSPECIFIED BACK PAIN LATERALITY: Primary | ICD-10-CM

## 2018-03-15 DIAGNOSIS — M54.40 CHRONIC LOW BACK PAIN WITH SCIATICA, SCIATICA LATERALITY UNSPECIFIED, UNSPECIFIED BACK PAIN LATERALITY: Primary | ICD-10-CM

## 2018-03-15 PROCEDURE — 97113 AQUATIC THERAPY/EXERCISES: CPT

## 2018-03-15 NOTE — PROGRESS NOTES
"                                                                                                                         Aquatic Progress Note      Subjective  Sunni reports 3/10 LBP, 2/10 R hip pn today.     Objective    Treatment: Sunni was instructed in and performed therapeutic exercises to develop strength, endurance, ROM, flexibility, balance, posture and core stabilization for 60 minutes,on this date . Patient performed therapeutic exercises consisting of warm up laps without resistance, PROM/Stretching, UE strengthening, LE strengthening, lumbar stablization, balance exercises, isometrics, Endurance, march, theraband and cool down.      Warm up laps fwd/bwd/side 3 laps each    HSS 3x20"  Quad str 3x20"     Lower extremity exs: 30x each 2.5#  -Mini squats with quad set  -Heel raise with glut set  -Hip flx with laq  -Hip ext with hs curl  -Lunges front/side      Upper extremity exs: 30 reps each   -shld flx/ext   -shld horiz abd/add   -Lat pull ytb   -shld rows ytc NP    Endurance 6 min  Marching:   Bicycling:(NP)     Cool down laps fwd/bwd/side 1 lap each     Patient was not issued HEP for pool.      Assessment  Pt with good tolerance to tx with progression of  ther ex today,  Pt with increased rajwinder to UE exs today, resuming Lat pull with resistance w/o difficulty.  Pt needs visual cues for proper body mechanics.   This is a 67 y.o. female referred to outpatient physical therapy and presents with a medical diagnosis of low back pain and demonstrates limitations as described in the problem list. Pt will benefit from physical therapy services in order to maximize pain free functional independence.  Patient will continue to benefit from skilled PT intervention.    Sunni is making good progress towards established goals. PT/PTA face-to-face:discussed Pt's POC and progress towards established PT goals.  Al Saenz, PTA    Plan  Pt to be reassessed by PT..  "

## 2018-03-19 ENCOUNTER — CLINICAL SUPPORT (OUTPATIENT)
Dept: REHABILITATION | Facility: HOSPITAL | Age: 68
End: 2018-03-19
Attending: NEUROLOGICAL SURGERY
Payer: MEDICARE

## 2018-03-19 DIAGNOSIS — G89.29 CHRONIC LOW BACK PAIN WITH SCIATICA, SCIATICA LATERALITY UNSPECIFIED, UNSPECIFIED BACK PAIN LATERALITY: Primary | ICD-10-CM

## 2018-03-19 DIAGNOSIS — M54.40 CHRONIC LOW BACK PAIN WITH SCIATICA, SCIATICA LATERALITY UNSPECIFIED, UNSPECIFIED BACK PAIN LATERALITY: Primary | ICD-10-CM

## 2018-03-19 PROCEDURE — G8979 MOBILITY GOAL STATUS: HCPCS | Mod: CJ

## 2018-03-19 PROCEDURE — 97110 THERAPEUTIC EXERCISES: CPT

## 2018-03-19 PROCEDURE — G8978 MOBILITY CURRENT STATUS: HCPCS | Mod: CK

## 2018-03-19 NOTE — PROGRESS NOTES
Physical Therapy Progress Note - Hip/Knee/Ankle/Spine  Diagnosis:           Encounter Diagnosis   Name Primary?    Chronic low back pain, unspecified back pain laterality, with sciatica presence unspecified Yes          Subjective:   Pt has had 10 pool visits. She is also now seeing a chiropractor. She reports that her low back/sciatica pain is feeling much better which she attributes to prayer. Notes that she continues to have pain in the thoracic spine and difficulty lifting objects from the floor. 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 Pt reports that she plans to continue with aquatic therapy in an independent manner and would like to attempt land based therapy in order to develop better techniques for caring for her mother.     Pain level is 6/10.    Objective:  Treatment:   Pt received therapeutic exercises to improve ROM, strength, flexibility and proprioception such as:     One on one ther ex x 30 minutes  HSS 3x30 sec  LTR x 20  Open books x 15  Sidelying hip extension stretch 3x30 sec  Lifting education floor to waist x 5 min     Lumbar Range of Motion:     Degrees Pain   Flexion 60 degrees  (60 is norm) Dull pain in low back with bending forward   Extension 30 degrees  (25 is norm) No pain, just a stretch in the quads   Left Side Bending 25 degrees  (25 is norm) Slight pull without sharp pain   Right Side Bending 25 degrees  (25 is norm) Sharp right sided low back pain    Left Rotation 100% -   Right Rotation 100% Mild pain      Cervical AROM screen: WNL all directions, but with bilateral upper trap pain with flexion and extension     Lower Extremity Strength  Right LE   Left LE     Knee extension: 5/5 Knee extension: 4+/5   Knee flexion: 5/5 Knee flexion: 5/5   Hip flexion: 5/5 Hip flexion: *painful 4+/5   Hip extension:  4/5 Hip extension: 4+/5   Hip abduction: 4+/5 Hip abduction: 4+/5   Hip adduction: 5/5 Hip adduction  5/5   Ankle dorsiflexion: 4+/5 Ankle dorsiflexion: 5/5   Ankle plantarflexion: 5/5 Ankle plantarflexion: 5/5         Special Tests:  -SlumpTest: -  -Bridge Test: +    Written Home Exercises Provided:  No new HEP given today.    Assessment:    Pt has demonstrated improvements in strength and ROM without pain. She should continue to participate in aquatic activity in an independent manner as she has an appropriate understanding of her exercise routine. 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 This pt continues to have a good prognosis.    Pt will continue to benefit from skilled PT intervention. Medical Necessity is demonstrated by:  Unable to participate in daily activities    Patient is making Good progress towards established goals.    New/Revised Goals: see below     Plan:   GOALS: Short Term Goals: 3 weeks  1. Report decreased low back pain  <   / =  6  /10  to increase tolerance for completion of ADLs - Met  2. Pt to increase B side bending AROM by > or = 10 degrees in order to improve flexibility. - Met  3. Increased MMT  for  hip extension to 4+/5 bilaterally to increase tolerance for ADL and work activities.- Met  4. Pt to achieve 60 degrees of active lumbar flexion with decreased pain in order to demonstrate improvements in mobility.  Ongoing  5. Pt to tolerate HEP to improve ROM and independence with ADL's MET:2/21/2018     Long Term Goals: 6 weeks- Ongoing  1. Report decreased low back pain  <   / =  3 /10  to increase tolerance for completion of ADLs  2. Pt to increase B popliteal angle by > or = 20 degrees in order to improve flexibility and posture.   3. Increased MMT  for  hip extension to 5/5 bilaterally to increase tolerance for ADL and work activities.  4. Pt will report at CJ level (20-40% impaired)  on FOTO score for low back pain disability to demonstrate decrease in disability and improvement in back pain.  5. Pt to be Independent with HEP to improve ROM and independence with ADL's  6. Pt to demonstrate negative Bridge Test in order to show improved core strength for lumbar stabilization.      Plan      Pt will be treated by physical therapy 2 times a week for 4 weeks for Pt Education, HEP, therapeutic exercises, neuromuscular re-education, joint mobilizations, modalities prn to achieve established goals. Sunni may at times be seen by a PTA as part of the Rehab Team.      Gage Castle, PT , DPT, OCS  3/19/2018     I certify the need for these services furnished under this plan of treatment and while under my care.     ______________________________ Physician/Referring Practitioner  Date of Signature

## 2018-03-22 ENCOUNTER — CLINICAL SUPPORT (OUTPATIENT)
Dept: REHABILITATION | Facility: HOSPITAL | Age: 68
End: 2018-03-22
Attending: NEUROLOGICAL SURGERY
Payer: MEDICARE

## 2018-03-22 DIAGNOSIS — M54.40 CHRONIC LOW BACK PAIN WITH SCIATICA, SCIATICA LATERALITY UNSPECIFIED, UNSPECIFIED BACK PAIN LATERALITY: Primary | ICD-10-CM

## 2018-03-22 DIAGNOSIS — G89.29 CHRONIC LOW BACK PAIN WITH SCIATICA, SCIATICA LATERALITY UNSPECIFIED, UNSPECIFIED BACK PAIN LATERALITY: Primary | ICD-10-CM

## 2018-03-22 PROCEDURE — 97110 THERAPEUTIC EXERCISES: CPT

## 2018-03-22 NOTE — PROGRESS NOTES
Physical Therapy Progress Note - Hip/Knee/Ankle/Spine  Diagnosis:           Encounter Diagnosis   Name Primary?    Chronic low back pain, unspecified back pain laterality, with sciatica presence unspecified Yes      Subjective:   Patient reports that she is felling well with mild soreness on quad muscles. Pt does not verbalize a pain number.     Objective:  Treatment:   Pt received therapeutic exercises to improve ROM, strength, flexibility and proprioception such as:   EZ step 8 minutes   Standing:gastroc/HS stretch 1 minute B LEs   LTR and BKTC 2 minutes each   Bridges with ball between knees 2 x 10 reps - hold 5 sec   Hook lying gtb ER with core engaged 2 x 10 reps   Standing:heel raises 2 x 15 reps   Mini squat sit<=>stand from chair 2 x 10 reps   Picking up activity few reps - Pt practicing picking up things from low surface   ROM: not tested today     Written Home Exercises Provided:no changes made.   Instructed pt. regarding: Proper technique with all exercises. Pt demonstrated good understanding of the education provided. No cultural, environmental, or spiritual barriers identified to treatment or learning.    Assessment:Pt with good tolerance to PT today, no increase of pain during session, presents weak quad and hip muscles.   Pt will continue to benefit from skilled PT intervention. Medical Necessity is demonstrated by:  Unable to participate in daily activities    Patient is making Good progress towards established goals.    New/Revised Goals:remain appropriated     Plan:  Continue with established Plan of Care towards PT goals. PT/PTA face-to-face:discussed Pt's POC and progress towards established PT goals.  Louise Locke, PTA

## 2018-03-27 ENCOUNTER — CLINICAL SUPPORT (OUTPATIENT)
Dept: REHABILITATION | Facility: HOSPITAL | Age: 68
End: 2018-03-27
Attending: NEUROLOGICAL SURGERY
Payer: MEDICARE

## 2018-03-27 DIAGNOSIS — M54.40 CHRONIC LOW BACK PAIN WITH SCIATICA, SCIATICA LATERALITY UNSPECIFIED, UNSPECIFIED BACK PAIN LATERALITY: Primary | ICD-10-CM

## 2018-03-27 DIAGNOSIS — G89.29 CHRONIC LOW BACK PAIN WITH SCIATICA, SCIATICA LATERALITY UNSPECIFIED, UNSPECIFIED BACK PAIN LATERALITY: Primary | ICD-10-CM

## 2018-03-27 PROCEDURE — 97110 THERAPEUTIC EXERCISES: CPT

## 2018-03-27 NOTE — PROGRESS NOTES
Physical Therapy Progress Note - Hip/Knee/Ankle/Spine  Diagnosis:           Encounter Diagnosis   Name Primary?    Chronic low back pain, unspecified back pain laterality, with sciatica presence unspecified Yes      Subjective:   Patient reports feeling fine and rates pain 2/10 today.     Objective:  Treatment:   Pt received therapeutic exercises to improve ROM, strength, flexibility and proprioception such as:   EZ step 8 minutes   Standing:gastroc/HS stretch 1 minute B LEs   LTR and BKTC 2 minutes each   Bridges with ball between knees 2 x 10 reps - hold 5 sec   Hook lying gtb ER with core engaged 2 x 10 reps   Standing:heel raises 2 x 15 reps   Mini squat sit<=>stand from chair 2 x 10 reps   Wall squat using theraball 2 x 10 reps   Sitting:piriforms stretch 2 x 15 sec   ROM: not tested today     Written Home Exercises Provided:no changes made.   Instructed pt. regarding: Proper technique with all exercises. Pt demonstrated good understanding of the education provided. No cultural, environmental, or spiritual barriers identified to treatment or learning.    Assessment:Pt with good tolerance to PT today, no increase of pain during session, presents weak quad and hip muscles.   Pt will continue to benefit from skilled PT intervention. Medical Necessity is demonstrated by:  Unable to participate in daily activities    Patient is making Good progress towards established goals.    New/Revised Goals:remain appropriated     Plan:  Continue with established Plan of Care towards PT goals.

## 2018-04-03 ENCOUNTER — CLINICAL SUPPORT (OUTPATIENT)
Dept: REHABILITATION | Facility: HOSPITAL | Age: 68
End: 2018-04-03
Attending: NEUROLOGICAL SURGERY
Payer: MEDICARE

## 2018-04-03 DIAGNOSIS — M54.40 CHRONIC LOW BACK PAIN WITH SCIATICA, SCIATICA LATERALITY UNSPECIFIED, UNSPECIFIED BACK PAIN LATERALITY: Primary | ICD-10-CM

## 2018-04-03 DIAGNOSIS — G89.29 CHRONIC LOW BACK PAIN WITH SCIATICA, SCIATICA LATERALITY UNSPECIFIED, UNSPECIFIED BACK PAIN LATERALITY: Primary | ICD-10-CM

## 2018-04-03 PROCEDURE — 97110 THERAPEUTIC EXERCISES: CPT

## 2018-04-03 NOTE — PROGRESS NOTES
"Physical Therapy Progress Note - Hip/Knee/Ankle/Spine  Diagnosis:           Encounter Diagnosis   Name Primary?    Chronic low back pain, unspecified back pain laterality, with sciatica presence unspecified Yes      Subjective:   Patient states "I'm doing fine", she is w/o c/o LBP  today.     Objective:  Treatment:   Pt was instructed in and performed therapeutic exercises to improve ROM, strength, flexibility and proprioception such as:   EZ step 10 minutes   Standing:gastroc/HS stretch 2 minute B LEs   LTR and BKTC 2 minutes each   Bridges with ball between knees 2 x 10 reps - hold 5 sec   Hook lying gtb ER with core engaged 2 x 10 reps   Standing:heel raises 2 x 15 reps   Mini squat sit<=>stand from chair 2 x 10 reps   Wall squat using theraball 2 x 10 reps   Supine PROM:piriforms stretch 3 x 20 sec   ROM: not tested today     Written Home Exercises Provided:no changes made.   Instructed pt. regarding: Proper technique with all exercises. Pt demonstrated good understanding of the education provided. No cultural, environmental, or spiritual barriers identified to treatment or learning.    Assessment:Pt with good tolerance to PT today, no c/o pain during session, good rajwinder to PROM piriformis stretch.   Pt will continue to benefit from skilled PT intervention. Medical Necessity is demonstrated by:  Unable to participate in daily activities    Patient is making Good progress towards established goals.    New/Revised Goals:remain appropriated     Plan:  Continue with established Plan of Care towards PT goals.       "

## 2018-04-05 ENCOUNTER — CLINICAL SUPPORT (OUTPATIENT)
Dept: REHABILITATION | Facility: HOSPITAL | Age: 68
End: 2018-04-05
Attending: NEUROLOGICAL SURGERY
Payer: MEDICARE

## 2018-04-05 DIAGNOSIS — M54.40 CHRONIC LOW BACK PAIN WITH SCIATICA, SCIATICA LATERALITY UNSPECIFIED, UNSPECIFIED BACK PAIN LATERALITY: Primary | ICD-10-CM

## 2018-04-05 DIAGNOSIS — G89.29 CHRONIC LOW BACK PAIN WITH SCIATICA, SCIATICA LATERALITY UNSPECIFIED, UNSPECIFIED BACK PAIN LATERALITY: Primary | ICD-10-CM

## 2018-04-05 PROCEDURE — 97110 THERAPEUTIC EXERCISES: CPT

## 2018-04-05 NOTE — PROGRESS NOTES
"Physical Therapy Progress Note - Hip/Knee/Ankle/Spine  Diagnosis:           Encounter Diagnosis   Name Primary?    Chronic low back pain, unspecified back pain laterality, with sciatica presence unspecified Yes      Subjective:   Patient states "I'm doing fine", she is w/o c/o LBP  today.     Objective:  Treatment:   Pt was instructed in and performed therapeutic exercises to improve ROM, strength, flexibility and proprioception such as:   EZ step 10 minutes   Standing:gastroc/HS stretch 2 minute B LEs   LTR and BKTC 2 minutes each   Bridges with ball between knees 2 x 10 reps - hold 5 sec   Hook lying gtb ER with core engaged 2 x 10 reps   Standing:heel raises 2 x 15 reps   Mini squat sit<=>stand from chair 3 x 10 reps   Wall squat using theraball 3 x 10 reps   Supine PROM:piriforms stretch 3 x 20 sec   ROM: not tested today     Written Home Exercises Provided:no changes made.   Instructed pt. regarding: Proper technique with all exercises. Pt demonstrated good understanding of the education provided. No cultural, environmental, or spiritual barriers identified to treatment or learning.    Assessment:Pt with good tolerance to PT today, no c/o pain during session, good rajwinder to PROM piriformis stretch.   Pt will continue to benefit from skilled PT intervention. Medical Necessity is demonstrated by:  Unable to participate in daily activities    Patient is making Good progress towards established goals.    New/Revised Goals:remain appropriated     Plan:  Continue with established Plan of Care towards PT goals.       "

## 2018-04-10 ENCOUNTER — CLINICAL SUPPORT (OUTPATIENT)
Dept: REHABILITATION | Facility: HOSPITAL | Age: 68
End: 2018-04-10
Attending: NEUROLOGICAL SURGERY
Payer: MEDICARE

## 2018-04-10 DIAGNOSIS — M54.40 CHRONIC LOW BACK PAIN WITH SCIATICA, SCIATICA LATERALITY UNSPECIFIED, UNSPECIFIED BACK PAIN LATERALITY: Primary | ICD-10-CM

## 2018-04-10 DIAGNOSIS — G89.29 CHRONIC LOW BACK PAIN WITH SCIATICA, SCIATICA LATERALITY UNSPECIFIED, UNSPECIFIED BACK PAIN LATERALITY: Primary | ICD-10-CM

## 2018-04-10 PROCEDURE — 97110 THERAPEUTIC EXERCISES: CPT

## 2018-04-10 NOTE — PROGRESS NOTES
"Physical Therapy Progress Note - Hip/Knee/Ankle/Spine  Diagnosis:           Encounter Diagnosis   Name Primary?    Chronic low back pain, unspecified back pain laterality, with sciatica presence unspecified Yes      Subjective:   Patient states "I banged my toe and it's hurting".     Objective:  Treatment:   Pt was instructed in and performed therapeutic exercises to improve ROM, strength, flexibility and proprioception such as:   EZ step 10 minutes   Standing:gastroc/HS stretch 2 minute B LEs   LTR and BKTC 2 minutes each   Bridges with ball between knees 2 x 10 reps - hold 5 sec   Hook lying gtb ER with core engaged 2 x 10 reps   Standing:heel raises 2 x 15 reps NP  Mini squat sit<=>stand from chair 3 x 10 reps   Wall squat using theraball 3 x 10 reps   Supine PROM:piriforms stretch 3 x 20 sec   ROM: not tested today     Written Home Exercises Provided:no changes made.   Instructed pt. regarding: Proper technique with all exercises. Pt demonstrated good understanding of the education provided. No cultural, environmental, or spiritual barriers identified to treatment or learning.    Assessment:Pt with good tolerance to PT today, no c/o pain during session, good rajwinder to PROM piriformis stretch. Heel Raise not performed due to above c/o pt injuring her toe.  Pt will continue to benefit from skilled PT intervention. Medical Necessity is demonstrated by:  Unable to participate in daily activities    Patient is making Good progress towards established goals.    New/Revised Goals:remain appropriated     Plan:  Continue with established Plan of Care towards PT goals.       "

## 2018-04-12 ENCOUNTER — CLINICAL SUPPORT (OUTPATIENT)
Dept: REHABILITATION | Facility: HOSPITAL | Age: 68
End: 2018-04-12
Attending: NEUROLOGICAL SURGERY
Payer: MEDICARE

## 2018-04-12 DIAGNOSIS — M54.40 CHRONIC LOW BACK PAIN WITH SCIATICA, SCIATICA LATERALITY UNSPECIFIED, UNSPECIFIED BACK PAIN LATERALITY: Primary | ICD-10-CM

## 2018-04-12 DIAGNOSIS — G89.29 CHRONIC LOW BACK PAIN WITH SCIATICA, SCIATICA LATERALITY UNSPECIFIED, UNSPECIFIED BACK PAIN LATERALITY: Primary | ICD-10-CM

## 2018-04-12 PROCEDURE — G8979 MOBILITY GOAL STATUS: HCPCS | Mod: CJ

## 2018-04-12 PROCEDURE — G8978 MOBILITY CURRENT STATUS: HCPCS | Mod: CJ

## 2018-04-12 PROCEDURE — G8980 MOBILITY D/C STATUS: HCPCS | Mod: CJ

## 2018-04-12 PROCEDURE — 97110 THERAPEUTIC EXERCISES: CPT

## 2018-04-12 NOTE — PROGRESS NOTES
Physical Therapy Progress Note - Hip/Knee/Ankle/Spine  Diagnosis:           Encounter Diagnosis   Name Primary?    Chronic low back pain, unspecified back pain laterality, with sciatica presence unspecified Yes          Subjective:   Pt continues to see a chiropractor. Reports that her pain is no longer constant and she is feeling stronger.     Pain level is 4/10.    Objective:  Treatment:   Pt received therapeutic exercises to improve ROM, strength, flexibility and proprioception such as:     EZ step 10 minutes   Standing:gastroc/HS stretch 2 minute B LEs   LTR and BKTC 2 minutes each   Bridges with ball between knees 2 x 10 reps - hold 5 sec   Hook lying gtb ER with core engaged 2 x 10 reps   Standing:heel raises 2 x 15 reps   Mini squat sit<=>stand from chair 3 x 10 reps   Wall squat using theraball 3 x 10 reps   Supine PROM:piriforms stretch 3 x 20 sec     Lumbar Range of Motion:     Degrees Pain   Flexion 60 degrees  (60 is norm) Dull pain in low back with bending forward   Extension 30 degrees  (25 is norm) No pain, just a stretch in the quads   Left Side Bending 25 degrees  (25 is norm) Slight pull without sharp pain   Right Side Bending 25 degrees  (25 is norm) Mild right sided low back pain    Left Rotation 100% -   Right Rotation 100% Mild pain      Cervical AROM screen: WNL all directions, but with bilateral upper trap pain with flexion and extension     Lower Extremity Strength  Right LE   Left LE     Knee extension: 5/5 Knee extension: 4+/5   Knee flexion: 5/5 Knee flexion: 5/5   Hip flexion: 5/5 Hip flexion:  4+/5   Hip extension:  4/5 Hip extension: 4+/5   Hip abduction: 4+/5 Hip abduction: 4+/5   Hip adduction: 5/5 Hip adduction 5/5   Ankle dorsiflexion: 4+/5 Ankle dorsiflexion: 5/5   Ankle plantarflexion: 5/5 Ankle plantarflexion: 5/5         Special Tests:  -SlumpTest: -  -Bridge Test: -  - FOTO: 51 (original 43)    Written Home Exercises Provided:  No new HEP given today.    Assessment:    Pt has  demonstrated improvements in strength and ROM without pain. She should continue to participate in aquatic activity in an independent manner as she has an appropriate understanding of her exercise routine. The pt has been set up with a referral to [facility] and should continue to work on general strength/conditioning in both the land and aquatic setting.  Pt will continue to benefit from skilled PT intervention. Medical Necessity is demonstrated by:  Unable to participate in daily activities    Patient is making Good progress towards established goals.    New/Revised Goals: see below     Plan:   GOALS: Short Term Goals: 3 weeks  1. Report decreased low back pain  <   / =  6  /10  to increase tolerance for completion of ADLs - Met  2. Pt to increase B side bending AROM by > or = 10 degrees in order to improve flexibility. - Met  3. Increased MMT  for  hip extension to 4+/5 bilaterally to increase tolerance for ADL and work activities.- Met  4. Pt to achieve 60 degrees of active lumbar flexion with decreased pain in order to demonstrate improvements in mobility.  Ongoing  5. Pt to tolerate HEP to improve ROM and independence with ADL's MET:2/21/2018     Long Term Goals: 6 weeks- 95%  1. Report decreased low back pain  <   / =  3 /10  to increase tolerance for completion of ADLs  2. Pt to increase B popliteal angle by > or = 20 degrees in order to improve flexibility and posture.   3. Increased MMT  for  hip extension to 5/5 bilaterally to increase tolerance for ADL and work activities.  4. Pt will report at CJ level (20-40% impaired) on FOTO score for low back pain disability to demonstrate decrease in disability and improvement in back pain.  5. Pt to be Independent with HEP to improve ROM and independence with ADL's  6. Pt to demonstrate negative Bridge Test in order to show improved core strength for lumbar stabilization.      Plan      D/c from skilled PT and transition to [facility]       Gage Castle, PT , DPT, OCS  3/19/2018     I certify the need for these services furnished under this plan of treatment and while under my care.     ______________________________ Physician/Referring Practitioner  Date of Signature

## 2019-11-12 DIAGNOSIS — R10.31 ABDOMINAL PAIN, RIGHT LOWER QUADRANT: ICD-10-CM

## 2019-11-12 DIAGNOSIS — M25.551 RIGHT HIP PAIN: Primary | ICD-10-CM

## 2019-12-26 ENCOUNTER — CLINICAL SUPPORT (OUTPATIENT)
Dept: REHABILITATION | Facility: HOSPITAL | Age: 69
End: 2019-12-26
Payer: MEDICARE

## 2019-12-26 DIAGNOSIS — R52 PAIN AGGRAVATED BY WALKING: ICD-10-CM

## 2019-12-26 PROCEDURE — 97110 THERAPEUTIC EXERCISES: CPT

## 2019-12-26 PROCEDURE — 97161 PT EVAL LOW COMPLEX 20 MIN: CPT

## 2019-12-26 NOTE — PLAN OF CARE
[facility]  Physical Therapy Initial Evaluation    Name: Sunni No  Clinic Number: [id number]    Therapy Diagnosis:   Encounter Diagnosis   Name Primary?    Pain aggravated by walking      Physician: Mary Lima MD    Physician Orders: Eval & treat aquatic therapy  Medical Diagnosis from Referral: M25.551 (ICD-10-CM) - Right hip pain  Evaluation Date: 12/26/2019  Authorization Period Expiration: 12/31/2019  Plan of Care Expiration: 3/19/2020  Visit # / Visits authorized: 1/ 1  Total visits: 1    Time In: 1530  Time Out: 1620  Total Billable Time: 50 minutes (eval, ex)    Precautions: Standard and Fall    Subjective   Date of onset: Fall 10/2019   History of current condition - Sunni reports: hit toe & tripped over step & fell on R hip; No fracture but continuous pain since fall; increased pain with prolonged sitting or standing; picking up heavy objects causes more pain; Bending down to put clothes in/out of dryer causes more intense pain; sleeps on back (no pillow under legs)       Past Medical History:   Diagnosis Date    Ulcer      Sunni No has GERD, bradycardia    Sunni has a current medication list which includes the following prescription(s): dicyclomine and tramadol.    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  Prior Level of Function: I with mobility  Current Level of Function: I with mobility but distance limited 2/2 pain    Pain:  Current 6/10, worst 10/10, best 5/10   Location: bilateral back & R hip  Description: Throbbing and Deep  Aggravating Factors: Sitting, Standing, Bending and Flexing  Easing Factors: ice and hot bath    Pts goals: less pain     Objective     TUG: 15 sec using B hands on chair  5TSTS: 29 sec using B  hands on chair    Observation: walks into clinic trendelenberg gait      Posture: rounded shoulders, forward flexed trunk      Gait: decreased step length, decreased weight shifting over RLE with stance phase of gait    Lumbar Range of Motion:    Degrees Pain   Flexion 49 cm fingertip to floor   yes        Extension 10 degrees   yes        Left Side Bending 56cm finger tip to floor yes        Right Side Bending 55 cm finger tip to floor yes        Left rotation   No limitation no        Right Rotation   No limitation no                Range of Motion: AROM   Hip  Right (pain)   Flexion  50  degrees   Abduction  20  degrees   Ext Rotation  10  degrees   Int Rotation  10  degrees           Strength:  Hip Left Right   Flexion 4-/5 3+/5   Abduction 4-/5 3+/5   Adduction 4-/5 3+/5   Extension 4-/5 3+/5   External Rot 4-/5 3+/5   Internal Rot 4-/5 3+/5     Knee Left Right   Extension 4-/5 3/5-   Flexion 4-/5 4-/5     Ankle Left Right   Dorsiflexion 4-/5 4-/5   Plantarflexion 4-/5 4-/5   Inversion 4-/5 4-/5   Eversion 4-/5 4-/5       Special Tests:  SLUMP -R, -L  + R FADIR    Joint Mobility: hypomobile R lumbar paraspinals    Palpation: TTP R ITB, R lumbar paraspinals, R SI, R piriformis    Sensation: intact light touch BLEs    Flexibility:   Tight gatsroc/soleus B & R hip flexor    CMS Impairment/Limitation/Restriction for FOTO hip Survey    Therapist reviewed FOTO scores for Sunni No on 12/26/2019.   FOTO documents entered into [employer] - see Media section.    Limitation Score: 67%%  Category: Mobility    Current : CL = least 60% but < 80% impaired, limited or restricted  Goal: CK = at least 40% but < 60% impaired, limited or restricted           TREATMENT   Treatment Time In: 1605  Treatment Time Out: 1620  Total Treatment time separate from Evaluation: 15 minutes    Sunni received therapeutic exercises to develop flexibility, posture and core stabilization for 15 minutes including: lower truncal rotation x  10 reps  Body positioning instruction: log rolling; use of pillow under RLE when sleeping on her back      Home Exercises and Patient Education Provided    Education provided:   Role of aquatic therapy      Written Home Exercises Provided: yes.  Exercises were reviewed and Sunni was able to demonstrate them prior to the end of the session.  Sunni demonstrated good  understanding of the education provided.     See EMR under Patient Instructions for exercises provided 12/26/2019.    Assessment   Sunni is a 69 y.o. female referred to outpatient Physical Therapy with a medical diagnosis of R hip & back pain. Pt presents with pain, decreased ROM, decreased strength, decreased flexibility which impacts the patient's ability to complete functional tasks & activities safely & timely.    Pt prognosis is Fair.   Pt will benefit from skilled aquatic outpatient Physical Therapy to address the deficits stated above and in the chart below, provide pt/family education, and to maximize pt's level of independence.     Plan of care discussed with patient: Yes  Pt's spiritual, cultural and educational needs considered and patient is agreeable to the plan of care and goals as stated below:     Anticipated Barriers for therapy: chronic pain    Medical Necessity is demonstrated by the following  History  Co-morbidities and personal factors that may impact the plan of care Co-morbidities:   bradycardia    Personal Factors:   no deficits     low   Examination  Body Structures and Functions, activity limitations and participation restrictions that may impact the plan of care Body Regions:   back  lower extremities    Body Systems:    ROM  strength  gait  transfers  transitions    Participation Restrictions:   none    Activity limitations:   Learning and applying knowledge  no deficits    General Tasks and Commands  no deficits    Communication  no deficits    Mobility  lifting and carrying objects  walking  driving (bike, car,  motorcycle)    Self care  dressing    Domestic Life  shopping  cooking  doing house work (cleaning house, washing dishes, laundry)  assisting others    Interactions/Relationships  no deficits    Life Areas  no deficits    Community and Social Life  community life  recreation and leisure         low   Clinical Presentation stable and uncomplicated low   Decision Making/ Complexity Score: low     Goals:  Short Term Goals: 6 weeks   1. Patient will be independent in HEP & progressions  2. Patient will achieve TUG score of 14 or less with only single UE support for sit to/from stand    Long Term Goals: 12 weeks   1. Patient  will have FOTO limitation score of 40-60% to demonstrate improved function & decreased pain  2. Patient will achieve FTSTS score of 20 sec or less to demonstrate improved transfers & endurance  3. Patient will achieve lumbar flexion to 35 cm finger tip to floor to demonstrate improved lumbar AROM & flexibility  4. Patient will achieve MMT BLE to 4/5 to demonstrate improved strength    Plan   Plan of care Certification: 12/26/2019 to 3/19/2020.    Outpatient aquatic Physical Therapy 1-2 times weekly for  12 weeks to include the following interventions: aquatic therapy, manual therapy, patient education, therapeutic exercise, therapeutic activity  Patient may be seen by PTA as part of the rehabilitation team      Charmaine Lopez, PT

## 2020-01-17 ENCOUNTER — CLINICAL SUPPORT (OUTPATIENT)
Dept: REHABILITATION | Facility: HOSPITAL | Age: 70
End: 2020-01-17
Payer: MEDICARE

## 2020-01-17 DIAGNOSIS — R52 PAIN AGGRAVATED BY WALKING: ICD-10-CM

## 2020-01-17 PROCEDURE — 97113 AQUATIC THERAPY/EXERCISES: CPT

## 2020-01-17 NOTE — PROGRESS NOTES
Physical Therapy Daily Treatment Note     Name: Sunni No  Clinic Number: [id number]    Therapy Diagnosis:   Encounter Diagnosis   Name Primary?    Pain aggravated by walking      Physician: Mary Lima MD    Visit Date: 1/17/2020     Physician Orders: Eval & treat aquatic therapy  Medical Diagnosis from Referral: M25.551 (ICD-10-CM) - Right hip pain  Evaluation Date: 12/26/2019  Authorization Period Expiration: 1/16/2021  Plan of Care Expiration: 3/19/2020  Visit # / Visits authorized: 1/ 1  Total visits: 2    Time In: 1500  Time Out: 1600  Total Billable Time: 15 minutes    Precautions: Standard and Fall    PROCEDURES/IMAGING:  Subjective     Pt reports: My body was hurting earlier this week from helping my parents  She was compliant with home exercise program.  Response to previous treatment: 1st pool treatment  Functional change: none, walks into clinic    Pain: 6/10  Location: bilateral back  & R hip    Objective     Sunni received aquatic therapeutic exercises to develop strength, endurance, ROM, flexibility, posture and core stabilization for 60 minutes including:    WARMUP x 2 laps each  Walk fwd/back/side    STRETCHES 2 x 20sec  Hip flexor  Piriformis  Gastroc      LE EX x 20  Mini squat  Heel raise with GS  Hip abd/add  Hip flex/ext  LAQ with hip flex    UE EX/CORE  x 20  Shoulder flex/ext TA activation APO  Shoulder horizontal abd/add TA activation APO  Shoulder abd/add TA activation APO    ENDURANCE  Marching with BUE arm swing using PVC x 4 min    COOL DOWN x 1 lap each  Walk fwd/back/side      Home Exercises Provided and Patient Education Provided     Education provided:   Role of aquatic therapy  Hydration post therapy      Written Home Exercises Provided: Patient instructed to cont prior HEP.  Exercises were reviewed and Sunni was able to demonstrate them prior to the end of the session.  Sunni demonstrated good  understanding of the education provided.     See HEP2GO for  exercises provided prior visit 12/26/2019    Assessment     Patient required frequent verbal cues  for proper technique, upright trunk, retracted shoulders & core stabilization. Patient did fair post instruction. Patient will benefit from aquatic environment to unload  Spine & Bilateral LEs for strengthening, core stabilization & postural awareness.      Sunni is progressing well towards her goals.   Pt prognosis is Fair.     Pt will continue to benefit from skilled aquatic outpatient physical therapy to address the deficits listed in the problem list box on initial evaluation, provide pt/family education and to maximize pt's level of independence in the home and community environment.     Pt's spiritual, cultural and educational needs considered and pt agreeable to plan of care and goals.    Anticipated barriers to physical therapy: none    Goals:   Short Term Goals: 6 weeks   1. Patient will be independent in HEP & progressions (progressing)  2. Patient will achieve TUG score of 14 or less with only single UE support for sit to/from stand (progressing)     Long Term Goals: 12 weeks   1. Patient  will have FOTO limitation score of 40-60% to demonstrate improved function & decreased pain (progressing)  2. Patient will achieve FTSTS score of 20 sec or less to demonstrate improved transfers & endurance (progressing)  3. Patient will achieve lumbar flexion to 35 cm finger tip to floor to demonstrate improved lumbar AROM & flexibility (progressing)  4. Patient will achieve MMT BLE to 4/5 to demonstrate improved strength (progressing)        Plan   Increase exercises slowly to prevent pain    Plan of care Certification: 12/26/2019 to 3/19/2020.     Outpatient aquatic Physical Therapy 1-2 times weekly for  12 weeks to include the following interventions: aquatic therapy, manual therapy, patient education, therapeutic exercise, therapeutic activity  Patient may be seen by PTA as part of the rehabilitation team    Charmaine  John, PT

## 2020-01-21 ENCOUNTER — CLINICAL SUPPORT (OUTPATIENT)
Dept: REHABILITATION | Facility: HOSPITAL | Age: 70
End: 2020-01-21
Payer: MEDICARE

## 2020-01-21 DIAGNOSIS — R52 PAIN AGGRAVATED BY WALKING: ICD-10-CM

## 2020-01-21 PROCEDURE — 97113 AQUATIC THERAPY/EXERCISES: CPT

## 2020-01-21 NOTE — PROGRESS NOTES
Physical Therapy Daily Treatment Note     Name: Sunni No  Clinic Number: [id number]    Therapy Diagnosis:   Encounter Diagnosis   Name Primary?    Pain aggravated by walking      Physician: Mary Lima MD    Visit Date: 1/21/2020     Physician Orders: Eval & treat aquatic therapy  Medical Diagnosis from Referral: M25.551 (ICD-10-CM) - Right hip pain  Evaluation Date: 12/26/2019  Authorization Period Expiration: 12/31/2020  Plan of Care Expiration: 3/19/2020  Visit # / Visits authorized: 2/ 20  Total visits: 3    Time In: 1500  Time Out: 1600  Total Billable Time: 30 minutes    Precautions: Standard and Fall    PROCEDURES/IMAGING:  Subjective     Pt reports: I was only sore for a day  She was compliant with home exercise program.  Response to previous treatment: muscle soreness x 1 day  Functional change: none, walks into clinic    Pain: 5/10  Location: bilateral back  & R hip    Objective     Sunni received aquatic therapeutic exercises to develop strength, endurance, ROM, flexibility, posture and core stabilization for 60 minutes including:    WARMUP x 2 laps each  Walk fwd/back/side    STRETCHES 2 x 20sec  Hip flexor  Piriformis  Gastroc    LE EX x 20  Mini squat  Heel raise with GS  Hip abd/add  Hip flex/ext  LAQ with hip flex  Lunges forward x 10 each LE    UE EX/CORE  x 20  Shoulder flex/ext TA activation APO modified tandem stance  Shoulder horizontal abd/add TA activation APO modified tandem stance  Shoulder abd/add TA activation APO modified tandem stance    ENDURANCE  Marching with BUE arm swing using PVC x 4 min    COOL DOWN x 1 lap each  Walk fwd/back/side      Home Exercises Provided and Patient Education Provided     Education provided:   Role of aquatic therapy  Hydration post therapy      Written Home Exercises Provided: Patient instructed to cont prior HEP.  Exercises were reviewed and Sunni was able to demonstrate them prior to the end of the session.  Sunni demonstrated good   understanding of the education provided.     See HEP2GO for exercises provided prior visit 12/26/2019    Assessment     Patient required intermittent verbal cues  For TA activation with exercises to maximize postural control. Patient did well post instruction. Patient able to tolerate addition of modified stance with tubing exercises & no increase in pain Patient will benefit from aquatic environment to unload  Spine & Bilateral LEs for strengthening, core stabilization & postural awareness.      Sunni is progressing well towards her goals.   Pt prognosis is Fair.     Pt will continue to benefit from skilled aquatic outpatient physical therapy to address the deficits listed in the problem list box on initial evaluation, provide pt/family education and to maximize pt's level of independence in the home and community environment.     Pt's spiritual, cultural and educational needs considered and pt agreeable to plan of care and goals.    Anticipated barriers to physical therapy: none    Goals:   Short Term Goals: 6 weeks   1. Patient will be independent in HEP & progressions (progressing)  2. Patient will achieve TUG score of 14 or less with only single UE support for sit to/from stand (progressing)     Long Term Goals: 12 weeks   1. Patient  will have FOTO limitation score of 40-60% to demonstrate improved function & decreased pain (progressing)  2. Patient will achieve FTSTS score of 20 sec or less to demonstrate improved transfers & endurance (progressing)  3. Patient will achieve lumbar flexion to 35 cm finger tip to floor to demonstrate improved lumbar AROM & flexibility (progressing)  4. Patient will achieve MMT BLE to 4/5 to demonstrate improved strength (progressing)        Plan   Increase exercises slowly to prevent pain    Plan of care Certification: 12/26/2019 to 3/19/2020.     Outpatient aquatic Physical Therapy 1-2 times weekly for  12 weeks to include the following interventions: aquatic therapy, manual  therapy, patient education, therapeutic exercise, therapeutic activity  Patient may be seen by PTA as part of the rehabilitation team    Charmaine Lopez, PT

## 2020-01-29 ENCOUNTER — CLINICAL SUPPORT (OUTPATIENT)
Dept: REHABILITATION | Facility: HOSPITAL | Age: 70
End: 2020-01-29
Payer: MEDICARE

## 2020-01-29 DIAGNOSIS — R52 PAIN AGGRAVATED BY WALKING: ICD-10-CM

## 2020-01-29 PROCEDURE — 97113 AQUATIC THERAPY/EXERCISES: CPT

## 2020-01-29 NOTE — PROGRESS NOTES
Physical Therapy Daily Treatment Note     Name: Sunni No  Clinic Number: [id number]    Therapy Diagnosis:   Encounter Diagnosis   Name Primary?    Pain aggravated by walking      Physician: Mary Lima MD    Visit Date: 1/29/2020     Physician Orders: Eval & treat aquatic therapy  Medical Diagnosis from Referral: M25.551 (ICD-10-CM) - Right hip pain  Evaluation Date: 12/26/2019  Authorization Period Expiration: 12/31/2020  Plan of Care Expiration: 3/19/2020  Visit # / Visits authorized: 3/ 20  Total visits: 4    Time In: 1540  Time Out: 1640  Total Billable Time: 30 minutes    Precautions: Standard and Fall    PROCEDURES/IMAGING:  Subjective     Pt reports: I was hurting more yesterday  She was compliant with home exercise program.  Response to previous treatment: muscle soreness x 1 day  Functional change: none    Pain: 6/10  Location: bilateral back  & R hip    Objective     Sunni received aquatic therapeutic exercises to develop strength, endurance, ROM, flexibility, posture and core stabilization for 60 minutes including:    WARMUP x 2 laps each  Walk fwd/back/side    STRETCHES 2 x 20sec  Hip flexor  Piriformis  Gastroc    LE EX x 20  Mini squat  Heel raise with GS  Hip abd/add  Hip flex/ext  LAQ with hip flex x 10 each LE  Lunges forward x 10 each LE-np  Fire hydrants x 10 each LE    UE EX/CORE  x 20  Shoulder flex/ext TA activation APO modified tandem stance alteranting  Shoulder horizontal abd/add TA activation APO modified tandem stance alternating  Shoulder abd/add TA activation APO modified tandem stance alternating    ENDURANCE  Marching with BUE arm swing using PVC x 4 min-np  Bicycle // bars x 5 min    COOL DOWN x 1 lap each  Walk fwd/back/side      Home Exercises Provided and Patient Education Provided     Education provided:   Role of aquatic therapy  Hydration post therapy      Written Home Exercises Provided: Patient instructed to cont prior HEP.  Exercises were reviewed and  Sunni was able to demonstrate them prior to the end of the session.  Sunni demonstrated good  understanding of the education provided.     See HEP2GO for exercises provided prior visit 12/26/2019    Assessment     Patient required intermittent verbal cues  For TA activation & upright trunk with shoulders over hips especially with single LE & paddle exercises.. Patient did well post instruction. Patient does get relief of some pain when in the pool.      Sunni is progressing well towards her goals.   Pt prognosis is Fair.     Pt will continue to benefit from skilled aquatic outpatient physical therapy to address the deficits listed in the problem list box on initial evaluation, provide pt/family education and to maximize pt's level of independence in the home and community environment.     Pt's spiritual, cultural and educational needs considered and pt agreeable to plan of care and goals.    Anticipated barriers to physical therapy: none    Goals:   Short Term Goals: 6 weeks   1. Patient will be independent in HEP & progressions (progressing)  2. Patient will achieve TUG score of 14 or less with only single UE support for sit to/from stand (progressing)     Long Term Goals: 12 weeks   1. Patient  will have FOTO limitation score of 40-60% to demonstrate improved function & decreased pain (progressing)  2. Patient will achieve FTSTS score of 20 sec or less to demonstrate improved transfers & endurance (progressing)  3. Patient will achieve lumbar flexion to 35 cm finger tip to floor to demonstrate improved lumbar AROM & flexibility (progressing)  4. Patient will achieve MMT BLE to 4/5 to demonstrate improved strength (progressing)        Plan   Increase exercises slowly to prevent pain    Plan of care Certification: 12/26/2019 to 3/19/2020.     Outpatient aquatic Physical Therapy 1-2 times weekly for  12 weeks to include the following interventions: aquatic therapy, manual therapy, patient education, therapeutic  exercise, therapeutic activity  Patient may be seen by PTA as part of the rehabilitation team    Charmaine Lopez, PT

## 2020-01-31 ENCOUNTER — CLINICAL SUPPORT (OUTPATIENT)
Dept: REHABILITATION | Facility: HOSPITAL | Age: 70
End: 2020-01-31
Payer: MEDICARE

## 2020-01-31 DIAGNOSIS — R52 PAIN AGGRAVATED BY WALKING: ICD-10-CM

## 2020-01-31 PROCEDURE — 97113 AQUATIC THERAPY/EXERCISES: CPT

## 2020-01-31 NOTE — PROGRESS NOTES
Physical Therapy Daily Treatment Note     Name: Sunni No  Clinic Number: [id number]    Therapy Diagnosis:   Encounter Diagnosis   Name Primary?    Pain aggravated by walking      Physician: Mary Lima MD    Visit Date: 1/31/2020     Physician Orders: Eval & treat aquatic therapy  Medical Diagnosis from Referral: M25.551 (ICD-10-CM) - Right hip pain  Evaluation Date: 12/26/2019  Authorization Period Expiration: 12/31/2020  Plan of Care Expiration: 3/19/2020  Visit # / Visits authorized: 4/ 20  Total visits: 5    Time In: 0830  Time Out: 0930  Total Billable Time: 30 minutes    Precautions: Standard and Fall    PROCEDURES/IMAGING:  Subjective     Pt reports: I was hurting more yesterday  She was compliant with home exercise program.  Response to previous treatment: muscle soreness x 1 day  Functional change: none    Pain: 6/10  Location: bilateral back  & R hip    Objective     Sunni received aquatic therapeutic exercises to develop strength, endurance, ROM, flexibility, posture and core stabilization for 60 minutes including:    WARMUP x 2 laps each  Walk fwd/back/side    STRETCHES 2 x 20sec  Hip flexor  Piriformis  Gastroc    LE EX x 20  Mini squat  Heel raise with GS  Hip abd/add  Hip flex/ext  LAQ with hip flex   Lunges forward   Fire hydrants     UE EX/CORE  x 20  Shoulder flex/ext TA activation APC modified tandem stance alternating  Shoulder horizontal abd/add TA activation APO modified tandem stance alternating  Shoulder abd/add TA activation APO modified tandem stance alternating  Lower truncal rotation // bars x 20    ENDURANCE  Marching with BUE arm swing using PVC x 4 min-np  Bicycle // bars x 5 min    COOL DOWN x 1 lap each   Walk fwd/back/side      Home Exercises Provided and Patient Education Provided     Education provided:   Role of aquatic therapy  Hydration post therapy      Written Home Exercises Provided: Patient instructed to cont prior HEP.  Exercises were reviewed and Sunni  was able to demonstrate them prior to the end of the session.  Sunni demonstrated good  understanding of the education provided.     See HEP2GO for exercises provided prior visit 12/26/2019    Assessment   FOTO limitation score 65%(was 67%) indicative of some functional improvement  Patient progressing with aquatic therapy but still requires intermittent verbal instruction for TA activation especially with paddle & single LE exercises.  Patient did well post instruction. Patient was unable to tolerate lateral lunges 2/2 increase in R lower back pain.Improvement with cardiovascular endurance       Sunni is progressing well towards her goals.   Pt prognosis is Fair.     Pt will continue to benefit from skilled aquatic outpatient physical therapy to address the deficits listed in the problem list box on initial evaluation, provide pt/family education and to maximize pt's level of independence in the home and community environment.     Pt's spiritual, cultural and educational needs considered and pt agreeable to plan of care and goals.    Anticipated barriers to physical therapy: none    Goals:   Short Term Goals: 6 weeks   1. Patient will be independent in HEP & progressions (progressing)  2. Patient will achieve TUG score of 14 or less with only single UE support for sit to/from stand (progressing)     Long Term Goals: 12 weeks   1. Patient  will have FOTO limitation score of 40-60% to demonstrate improved function & decreased pain (progressing)  2. Patient will achieve FTSTS score of 20 sec or less to demonstrate improved transfers & endurance (progressing)  3. Patient will achieve lumbar flexion to 35 cm finger tip to floor to demonstrate improved lumbar AROM & flexibility (progressing)  4. Patient will achieve MMT BLE to 4/5 to demonstrate improved strength (progressing)        Plan   Increase exercises slowly to prevent pain    Plan of care Certification: 12/26/2019 to 3/19/2020.     Outpatient aquatic  Physical Therapy 1-2 times weekly for  12 weeks to include the following interventions: aquatic therapy, manual therapy, patient education, therapeutic exercise, therapeutic activity  Patient may be seen by PTA as part of the rehabilitation team    Charmaine Lopez, PT

## 2020-02-07 ENCOUNTER — CLINICAL SUPPORT (OUTPATIENT)
Dept: REHABILITATION | Facility: HOSPITAL | Age: 70
End: 2020-02-07
Payer: MEDICARE

## 2020-02-07 DIAGNOSIS — R52 PAIN AGGRAVATED BY WALKING: ICD-10-CM

## 2020-02-07 PROCEDURE — 97113 AQUATIC THERAPY/EXERCISES: CPT

## 2020-02-07 NOTE — PROGRESS NOTES
Physical Therapy Daily Treatment Note     Name: Sunni No  Clinic Number: [id number]    Therapy Diagnosis:   Encounter Diagnosis   Name Primary?    Pain aggravated by walking      Physician: Mary Lima MD    Visit Date: 2/7/2020     Physician Orders: Eval & treat aquatic therapy  Medical Diagnosis from Referral: M25.551 (ICD-10-CM) - Right hip pain  Evaluation Date: 12/26/2019  Authorization Period Expiration: 12/31/2020  Plan of Care Expiration: 3/19/2020  Visit # / Visits authorized: 5/ 20  Total visits: 6    Time In: 0850  Time Out: 0950  Total Billable Time: 30 minutes    Precautions: Standard and Fall    PROCEDURES/IMAGING:  Subjective     Pt reports: I am hurting today because on Wednesday My mother passed out & I had to help her to the floor  She was compliant with home exercise program.  Response to previous treatment: muscle soreness x 1 day  Functional change: none    Pain: 7/10  Location: bilateral back  & R hip    Objective     Sunni received aquatic therapeutic exercises to develop strength, endurance, ROM, flexibility, posture and core stabilization for 60 minutes including:    WARMUP x 2 laps each  Walk fwd/back/side    STRETCHES 2 x 20sec  Hip flexor  Piriformis  Gastroc    LE EX x 20  Mini squat  Heel raise with GS  Hip abd/add  Hip flex/ext  LAQ with hip flex   Lunges forward   Fire hydrants     UE EX/CORE  x 20  Shoulder flex/ext TA activation paddles closed modified tandem stance alternating  Shoulder horizontal abd/add TA activation paddles closed modified tandem stance alternating  Shoulder abd/add TA activation paddles closed modified tandem stance alternating  Lower truncal rotation // bars x 20    ENDURANCE  Marching with BUE arm swing using PVC x 4 min-np  Bicycle // bars x 6 min    COOL DOWN x 1 lap each   Walk fwd/back/side      Home Exercises Provided and Patient Education Provided     Education provided:   Role of aquatic therapy  Hydration post therapy      Written  Home Exercises Provided: Patient instructed to cont prior HEP.  Exercises were reviewed and Sunni was able to demonstrate them prior to the end of the session.  Sunni demonstrated good  understanding of the education provided.     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 Patient did get some relief of pain while in the pool    Sunni is progressing well towards her goals.   Pt prognosis is Fair.     Pt will continue to benefit from skilled aquatic outpatient physical therapy to address the deficits listed in the problem list box on initial evaluation, provide pt/family education and to maximize pt's level of independence in the home and community environment.     Pt's spiritual, cultural and educational needs considered and pt agreeable to plan of care and goals.    Anticipated barriers to physical therapy: none    Goals:   Short Term Goals: 6 weeks   1. Patient will be independent in HEP & progressions (progressing)  2. Patient will achieve TUG score of 14 or less with only single UE support for sit to/from stand (progressing)     Long Term Goals: 12 weeks   1. Patient  will have FOTO limitation score of 40-60% to demonstrate improved function & decreased pain (progressing)  2. Patient will achieve FTSTS score of 20 sec or less to demonstrate improved transfers & endurance (progressing)  3. Patient will achieve lumbar flexion to 35 cm finger tip to floor to demonstrate improved lumbar AROM & flexibility (progressing)  4. Patient will achieve MMT BLE to 4/5 to demonstrate improved strength (progressing)        Plan   Increase exercises slowly to prevent pain    Plan of care Certification: 12/26/2019 to 3/19/2020.     Outpatient aquatic Physical Therapy 1-2 times weekly  for  12 weeks to include the following interventions: aquatic therapy, manual therapy, patient education, therapeutic exercise, therapeutic activity  Patient may be seen by PTA as part of the rehabilitation team    Charmaine Lopez, PT

## 2020-02-14 ENCOUNTER — CLINICAL SUPPORT (OUTPATIENT)
Dept: REHABILITATION | Facility: HOSPITAL | Age: 70
End: 2020-02-14
Payer: MEDICARE

## 2020-02-14 DIAGNOSIS — R52 PAIN AGGRAVATED BY WALKING: ICD-10-CM

## 2020-02-14 PROCEDURE — 97113 AQUATIC THERAPY/EXERCISES: CPT

## 2020-02-14 NOTE — PROGRESS NOTES
Physical Therapy Daily Treatment Note     Name: Sunni No  Clinic Number: [id number]    Therapy Diagnosis:   Encounter Diagnosis   Name Primary?    Pain aggravated by walking      Physician: Mary Lima MD    Visit Date: 2/14/2020     Physician Orders: Eval & treat aquatic therapy  Medical Diagnosis from Referral: M25.551 (ICD-10-CM) - Right hip pain  Evaluation Date: 12/26/2019  Authorization Period Expiration: 12/31/2020  Plan of Care Expiration: 3/19/2020  Visit # / Visits authorized: 6/ 20  Total visits: 7    Time In: 1400  Time Out: 1500  Total Billable Time: 30 minutes    Precautions: Standard and Fall    PROCEDURES/IMAGING:  Subjective     Pt reports: I took my motrin before I came  She was compliant with home exercise program.  Response to previous treatment: muscle soreness x 1 day  Functional change: none    Pain: 4/10  Location: bilateral back  & R hip    Objective     Sunni received aquatic therapeutic exercises to develop strength, endurance, ROM, flexibility, posture and core stabilization for 60 minutes including:    WARMUP x 2 laps each  Walk fwd/back/side    STRETCHES 2 x 20sec  Hip flexor  Piriformis  Gastroc    LE EX x 20  Mini squat  Heel raise with GS  Hip abd/add  Hip flex/ext  LAQ with hip flex   Lunges forward with chest press small red kickboard  Fire hydrants     UE EX/CORE  x 20  Shoulder flex/ext TA activation paddles closed modified tandem stance alternating  Shoulder horizontal abd/add TA activation paddles closed modified tandem stance alternating  Shoulder abd/add TA activation paddles closed modified tandem stance alternating  Lower truncal rotation // bars x 20    ENDURANCE  Marching with BUE arm swing using PVC x 4 min-np  Bicycle // bars x 6 min    COOL DOWN x 1 lap each   Walk fwd/back/side      Home Exercises Provided and Patient Education Provided     Education provided:   Role of aquatic therapy  Hydration post therapy      Written Home Exercises Provided:  Patient instructed to cont prior HEP.  Exercises were reviewed and Sunni was able to demonstrate them prior to the end of the session.  Sunni demonstrated good  understanding of the education provided.     See HEP2GO for exercises provided prior visit 12/26/2019    Assessment     Patient progressing with aquatic therapy & was able to tolerate increased resistance during forward lunges with no increase in back pain. Improvement with cardiovascular endurance. Patient did get some relief of pain while in the pool    Sunni is progressing well towards her goals.   Pt prognosis is Fair.     Pt will continue to benefit from skilled aquatic outpatient physical therapy to address the deficits listed in the problem list box on initial evaluation, provide pt/family education and to maximize pt's level of independence in the home and community environment.     Pt's spiritual, cultural and educational needs considered and pt agreeable to plan of care and goals.    Anticipated barriers to physical therapy: none    Goals:   Short Term Goals: 6 weeks   1. Patient will be independent in HEP & progressions (progressing)  2. Patient will achieve TUG score of 14 or less with only single UE support for sit to/from stand (progressing)     Long Term Goals: 12 weeks   1. Patient  will have FOTO limitation score of 40-60% to demonstrate improved function & decreased pain (progressing)  2. Patient will achieve FTSTS score of 20 sec or less to demonstrate improved transfers & endurance (progressing)  3. Patient will achieve lumbar flexion to 35 cm finger tip to floor to demonstrate improved lumbar AROM & flexibility (progressing)  4. Patient will achieve MMT BLE to 4/5 to demonstrate improved strength (progressing)        Plan   Increase exercises slowly to prevent pain    Plan of care Certification: 12/26/2019 to 3/19/2020.     Outpatient aquatic Physical Therapy 1-2 times weekly for  12 weeks to include the following interventions:  aquatic therapy, manual therapy, patient education, therapeutic exercise, therapeutic activity  Patient may be seen by PTA as part of the rehabilitation team    Charmaine Lopez, PT

## 2020-02-21 ENCOUNTER — CLINICAL SUPPORT (OUTPATIENT)
Dept: REHABILITATION | Facility: HOSPITAL | Age: 70
End: 2020-02-21
Payer: MEDICARE

## 2020-02-21 DIAGNOSIS — R52 PAIN AGGRAVATED BY WALKING: ICD-10-CM

## 2020-02-21 PROCEDURE — 97113 AQUATIC THERAPY/EXERCISES: CPT

## 2020-02-21 NOTE — PROGRESS NOTES
Physical Therapy Daily Treatment Note     Name: Sunni No  Clinic Number: [id number]    Therapy Diagnosis:   Encounter Diagnosis   Name Primary?    Pain aggravated by walking      Physician: Mary Lima MD    Visit Date: 2/21/2020     Physician Orders: Eval & treat aquatic therapy  Medical Diagnosis from Referral: M25.551 (ICD-10-CM) - Right hip pain  Evaluation Date: 12/26/2019  Authorization Period Expiration: 12/31/2020  Plan of Care Expiration: 3/19/2020  Visit # / Visits authorized: 7/ 20  Total visits: 8    Time In: 0800  Time Out: 0900  Total Billable Time: 15 minutes    Precautions: Standard and Fall    PROCEDURES/IMAGING:  Subjective     Pt reports: I did not take any medication today, I cancelled Wednesday because my dad passed away.  She was compliant with home exercise program.  Response to previous treatment: muscle soreness x 1 day  Functional change: none    Pain: 4/10  Location: bilateral back  & R hip    Objective     Sunni received aquatic therapeutic exercises to develop strength, endurance, ROM, flexibility, posture and core stabilization for 60 minutes including:    WARMUP x 2 laps each  Walk fwd/back/side    STRETCHES 2 x 20sec  Hip flexor  Piriformis  Gastroc    LE EX x 20  Mini squat  Heel raise with GS  Hip abd/add  Hip flex/ext  LAQ with hip flex   Lunges forward with chest press small red kickboard  Fire hydrants     UE EX/CORE  x 20  Shoulder flex/ext TA activation lite DB modified tandem stance alternating  Shoulder horizontal abd/add TA activation lite DB modified tandem stance alternating  Shoulder abd/add TA activation lite DB modified tandem stance alternating  Lower truncal rotation // bars x 20    ENDURANCE  Marching with BUE arm swing using PVC x 4 min-np  Bicycle // bars x 6 min    COOL DOWN x 1 lap each   Walk fwd/back/side      Home Exercises Provided and Patient Education Provided     Education provided:   Role of aquatic therapy  Hydration post  therapy      Written Home Exercises Provided: Patient instructed to cont prior HEP.  Exercises were reviewed and Sunni was able to demonstrate them prior to the end of the session.  Sunni demonstrated good  understanding of the education provided.     See HEP2GO for exercises provided prior visit 12/26/2019    Assessment     Patient progressing with aquatic therapy & was able to tolerate increased resistance of Dumbbell exercise with no increase in back pain. Improvement with cardiovascular endurance.Patient reports some relief of pain wihth HEP.    Sunni is progressing well towards her goals.   Pt prognosis is Fair.     Pt will continue to benefit from skilled aquatic outpatient physical therapy to address the deficits listed in the problem list box on initial evaluation, provide pt/family education and to maximize pt's level of independence in the home and community environment.     Pt's spiritual, cultural and educational needs considered and pt agreeable to plan of care and goals.    Anticipated barriers to physical therapy: none    Goals:   Short Term Goals: 6 weeks   1. Patient will be independent in HEP & progressions (progressing)  2. Patient will achieve TUG score of 14 or less with only single UE support for sit to/from stand (progressing)     Long Term Goals: 12 weeks   1. Patient  will have FOTO limitation score of 40-60% to demonstrate improved function & decreased pain (progressing)  2. Patient will achieve FTSTS score of 20 sec or less to demonstrate improved transfers & endurance (progressing)  3. Patient will achieve lumbar flexion to 35 cm finger tip to floor to demonstrate improved lumbar AROM & flexibility (progressing)  4. Patient will achieve MMT BLE to 4/5 to demonstrate improved strength (progressing)        Plan   Increase exercises slowly to prevent pain    Plan of care Certification: 12/26/2019 to 3/19/2020.     Outpatient aquatic Physical Therapy 1-2 times weekly for  12 weeks to  include the following interventions: aquatic therapy, manual therapy, patient education, therapeutic exercise, therapeutic activity  Patient may be seen by PTA as part of the rehabilitation team    Charmaine Lopez, PT

## 2020-03-02 NOTE — PROGRESS NOTES
Physical Therapy Daily Treatment Note     Name: Sunni No  Clinic Number: [id number]    Therapy Diagnosis:   Encounter Diagnosis   Name Primary?    Pain aggravated by walking      Physician: Mary Lima MD    Visit Date: 3/3/2020     Physician Orders: Eval & treat aquatic therapy  Medical Diagnosis from Referral: M25.551 (ICD-10-CM) - Right hip pain  Evaluation Date: 12/26/2019  Authorization Period Expiration: 12/31/2020  Plan of Care Expiration: 3/19/2020  Visit # / Visits authorized: 8/ 20  Total visits: 9    Time In: 1430  Time Out: 1530  Total Billable Time: 30 minutes    Precautions: Standard and Fall    PROCEDURES/IMAGING:  Subjective     Pt reports: I took advil PM yesterday to help me sleep  She was compliant with home exercise program.  Response to previous treatment: muscle soreness x 1 day  Functional change: none    Pain: 0/10  Location: none    Objective     Sunni received aquatic therapeutic exercises to develop strength, endurance, ROM, flexibility, posture and core stabilization for 60 minutes including:    WARMUP x 2 laps each  Walk fwd/back/side    STRETCHES 2 x 20sec  Hip flexor  Piriformis  Gastroc    LE EX x 20  Mini squat  Heel raise with GS  Hip abd/add  Hip flex/ext  LAQ with hip flex   Lunges forward with chest press small red kickboard  Lunges lateral chest press small red kickboard  Fire hydrants     UE EX/CORE  x 20  Shoulder flex/ext TA activation lite DB modified tandem stance alternating  Shoulder horizontal abd/add TA activation lite DB modified tandem stance alternating  Shoulder abd/add TA activation lite DB modified tandem stance alternating  Lower truncal rotation // bars x 20    ENDURANCE  Marching with BUE arm swing using PVC x 4 min-np  Bicycle // bars x 6 min    COOL DOWN x 1 lap each   Walk fwd/back/side      Home Exercises Provided and Patient Education Provided     Education provided:   Role of aquatic therapy  Hydration post therapy      Written Home  Exercises Provided: Patient instructed to cont prior HEP.  Exercises were reviewed and Sunni was able to demonstrate them prior to the end of the session.  Sunni demonstrated good  understanding of the education provided.     See HEP2GO for exercises provided prior visit 12/26/2019    Assessment     Patient progressing with aquatic therapy & was able to tolerate addition of lateral lunges with verbal instruction for pain free range to prevent increase in pain. Patient has improvement in cardivascular endurance.    Sunni is progressing well towards her goals.   Pt prognosis is Fair.     Pt will continue to benefit from skilled aquatic outpatient physical therapy to address the deficits listed in the problem list box on initial evaluation, provide pt/family education and to maximize pt's level of independence in the home and community environment.     Pt's spiritual, cultural and educational needs considered and pt agreeable to plan of care and goals.    Anticipated barriers to physical therapy: none    Goals:   Short Term Goals: 6 weeks   1. Patient will be independent in HEP & progressions (progressing)  2. Patient will achieve TUG score of 14 or less with only single UE support for sit to/from stand (progressing)     Long Term Goals: 12 weeks   1. Patient  will have FOTO limitation score of 40-60% to demonstrate improved function & decreased pain (progressing)  2. Patient will achieve FTSTS score of 20 sec or less to demonstrate improved transfers & endurance (progressing)  3. Patient will achieve lumbar flexion to 35 cm finger tip to floor to demonstrate improved lumbar AROM & flexibility (progressing)  4. Patient will achieve MMT BLE to 4/5 to demonstrate improved strength (progressing)        Plan   Increase exercises slowly to prevent pain    Plan of care Certification: 12/26/2019 to 3/19/2020.     Outpatient aquatic Physical Therapy 1-2 times weekly for  12 weeks to include the following interventions:  aquatic therapy, manual therapy, patient education, therapeutic exercise, therapeutic activity  Patient may be seen by PTA as part of the rehabilitation team    Charmaine Lopez, PT

## 2020-03-03 ENCOUNTER — CLINICAL SUPPORT (OUTPATIENT)
Dept: REHABILITATION | Facility: HOSPITAL | Age: 70
End: 2020-03-03
Payer: MEDICARE

## 2020-03-03 DIAGNOSIS — R52 PAIN AGGRAVATED BY WALKING: ICD-10-CM

## 2020-03-03 PROCEDURE — 97113 AQUATIC THERAPY/EXERCISES: CPT

## 2020-03-05 NOTE — PROGRESS NOTES
Physical Therapy Daily Treatment Note     Name: Sunni No  Clinic Number: [id number]    Therapy Diagnosis:   Encounter Diagnosis   Name Primary?    Pain aggravated by walking      Physician: Mary Lima MD    Visit Date: 3/6/2020     Physician Orders: Eval & treat aquatic therapy  Medical Diagnosis from Referral: M25.551 (ICD-10-CM) - Right hip pain  Evaluation Date: 12/26/2019  Authorization Period Expiration: 12/31/2020  Plan of Care Expiration: 3/19/2020  Visit # / Visits authorized: 9/ 20  Total visits: 10    Time In: 0644  Time Out: 0744  Total Billable Time: 60 minutes    Precautions: Standard and Fall    PROCEDURES/IMAGING:  Subjective     Pt reports: I took motrin yesterday & it helps my pain  She was compliant with home exercise program.  Response to previous treatment: muscle soreness x 1 day  Functional change: better balance    Pain: 1/10  Location: none    Objective     Sunni received aquatic therapeutic exercises to develop strength, endurance, ROM, flexibility, posture and core stabilization for 60 minutes including:    WARMUP x 2 laps each  Walk fwd/back/side    STRETCHES 2 x 20sec  Hip flexor  Piriformis  Gastroc    LE EX x 20  Mini squat  Heel raise with GS  Hip abd/add  Hip flex/ext  LAQ with hip flex   Lunges forward with chest press small red kickboard  Lunges lateral chest press small red kickboard  Fire hydrants   Squat walks lateral x 1 lap  Monster walks x 1 lap    UE EX/CORE  x 20  Shoulder flex/ext TA activation lite DB modified tandem stance alternating  Shoulder horizontal abd/add TA activation lite DB modified tandem stance alternating  Shoulder abd/add TA activation lite DB modified tandem stance alternating  Lower truncal rotation // bars x 20    ENDURANCE    Bicycle // bars x 6 min    COOL DOWN x 1 lap each   Walk fwd/back/side    Home Exercises Provided and Patient Education Provided     Education provided:   Role of aquatic therapy  Hydration post  therapy      Written Home Exercises Provided: Patient instructed to cont prior HEP.  Exercises were reviewed and Sunni was able to demonstrate them prior to the end of the session.  Sunni demonstrated good  understanding of the education provided.     See HEP2GO for exercises provided prior visit 12/26/2019    Assessment   FOTO limitation score 55%(was 67%) indicative of some functional improvement  Patient progressing with aquatic therapy & was able to tolerate addition of lateral squat walks & monster walks with no increase in pain but did require verbal instruction for TA activation to prevent forward flexed trunk. Good effort with all exercises.    Sunni is progressing well towards her goals.   Pt prognosis is Fair.     Pt will continue to benefit from skilled aquatic outpatient physical therapy to address the deficits listed in the problem list box on initial evaluation, provide pt/family education and to maximize pt's level of independence in the home and community environment.     Pt's spiritual, cultural and educational needs considered and pt agreeable to plan of care and goals.    Anticipated barriers to physical therapy: none    Goals:   Short Term Goals: 6 weeks   1. Patient will be independent in HEP & progressions (progressing)  2. Patient will achieve TUG score of 14 or less with only single UE support for sit to/from stand (progressing)     Long Term Goals: 12 weeks   1. Patient  will have FOTO limitation score of 40-60% to demonstrate improved function & decreased pain (progressing)  2. Patient will achieve FTSTS score of 20 sec or less to demonstrate improved transfers & endurance (progressing)  3. Patient will achieve lumbar flexion to 35 cm finger tip to floor to demonstrate improved lumbar AROM & flexibility (progressing)  4. Patient will achieve MMT BLE to 4/5 to demonstrate improved strength (progressing)        Plan   Increase exercises slowly to prevent pain    Plan of care  Certification: 12/26/2019 to 3/19/2020.     Outpatient aquatic Physical Therapy 1-2 times weekly for  12 weeks to include the following interventions: aquatic therapy, manual therapy, patient education, therapeutic exercise, therapeutic activity  Patient may be seen by PTA as part of the rehabilitation team    Charmaine Lopez, PT

## 2020-03-06 ENCOUNTER — CLINICAL SUPPORT (OUTPATIENT)
Dept: REHABILITATION | Facility: HOSPITAL | Age: 70
End: 2020-03-06
Payer: MEDICARE

## 2020-03-06 DIAGNOSIS — R52 PAIN AGGRAVATED BY WALKING: ICD-10-CM

## 2020-03-06 PROCEDURE — 97113 AQUATIC THERAPY/EXERCISES: CPT

## 2020-03-11 ENCOUNTER — CLINICAL SUPPORT (OUTPATIENT)
Dept: REHABILITATION | Facility: HOSPITAL | Age: 70
End: 2020-03-11
Payer: MEDICARE

## 2020-03-11 DIAGNOSIS — R52 PAIN AGGRAVATED BY WALKING: ICD-10-CM

## 2020-03-11 PROCEDURE — 97113 AQUATIC THERAPY/EXERCISES: CPT

## 2020-03-11 NOTE — PROGRESS NOTES
Physical Therapy Daily Treatment Note     Name: Sunni No  Clinic Number: [id number]    Therapy Diagnosis:   Encounter Diagnosis   Name Primary?    Pain aggravated by walking      Physician: Mary Lima MD    Visit Date: 3/11/2020     Physician Orders: Eval & treat aquatic therapy  Medical Diagnosis from Referral: M25.551 (ICD-10-CM) - Right hip pain  Evaluation Date: 12/26/2019  Authorization Period Expiration: 12/31/2020  Plan of Care Expiration: 3/19/2020  Visit # / Visits authorized: 10/ 20  Total visits: 11    Time In: 1420  Time Out: 1520  Total Billable Time: 30 minutes    Precautions: Standard and Fall    PROCEDURES/IMAGING:  Subjective     Pt reports:My back is not as bad since I have not been taking care of my mother  She was compliant with home exercise program.  Response to previous treatment: muscle soreness x 1 day  Functional change: better balance    Pain: 3/10  Location: none    Objective     Sunni received aquatic therapeutic exercises to develop strength, endurance, ROM, flexibility, posture and core stabilization for 60 minutes including:    WARMUP x 2 laps each  Walk fwd/back/side    STRETCHES 2 x 20sec  Hip flexor  Piriformis  Gastroc    LE EX x 20  Mini squat  Heel raise with GS  Hip abd/add  Hip flex/ext  LAQ with hip flex   Lunges forward with chest press small red kickboard  Lunges lateral chest press small red kickboard  Fire hydrants   Squat walks lateral x 1 lap  Monster walks x 1 lap    UE EX/CORE  x 20  Shoulder flex/ext TA activation lite DB modified tandem stance alternating  Shoulder horizontal abd/add TA activation lite DB modified tandem stance alternating  Shoulder abd/add TA activation lite DB modified tandem stance alternating  Lower truncal rotation // bars x 20    ENDURANCE    Bicycle // bars x 6 min    COOL DOWN x 1 lap each   Walk fwd/back/side    Home Exercises Provided and Patient Education Provided     Education provided:   Role of aquatic  therapy  Hydration post therapy      Written Home Exercises Provided: Patient instructed to cont prior HEP.  Exercises were reviewed and Sunni was able to demonstrate them prior to the end of the session.  Sunni demonstrated good  understanding of the education provided.     See HEP2GO for exercises provided prior visit 12/26/2019    Assessment     Patient progressing with aquatic therapy & able to tolerate exercises with no increase in pain.Patient required intermittent verbal cues for TA activation with lunges to maintain upright trunk    Sunni is progressing well towards her goals.   Pt prognosis is Fair.     Pt will continue to benefit from skilled aquatic outpatient physical therapy to address the deficits listed in the problem list box on initial evaluation, provide pt/family education and to maximize pt's level of independence in the home and community environment.     Pt's spiritual, cultural and educational needs considered and pt agreeable to plan of care and goals.    Anticipated barriers to physical therapy: none    Goals:   Short Term Goals: 6 weeks   1. Patient will be independent in HEP & progressions (progressing)  2. Patient will achieve TUG score of 14 or less with only single UE support for sit to/from stand (progressing)     Long Term Goals: 12 weeks   1. Patient  will have FOTO limitation score of 40-60% to demonstrate improved function & decreased pain (progressing)  2. Patient will achieve FTSTS score of 20 sec or less to demonstrate improved transfers & endurance (progressing)  3. Patient will achieve lumbar flexion to 35 cm finger tip to floor to demonstrate improved lumbar AROM & flexibility (progressing)  4. Patient will achieve MMT BLE to 4/5 to demonstrate improved strength (progressing)        Plan   Increase exercises slowly to prevent pain    Plan of care Certification: 12/26/2019 to 3/19/2020.     Outpatient aquatic Physical Therapy 1-2 times weekly for  12 weeks to include the  following interventions: aquatic therapy, manual therapy, patient education, therapeutic exercise, therapeutic activity  Patient may be seen by PTA as part of the rehabilitation team    Charmaine Lopez, PT

## 2020-03-16 ENCOUNTER — DOCUMENTATION ONLY (OUTPATIENT)
Dept: REHABILITATION | Facility: HOSPITAL | Age: 70
End: 2020-03-16

## 2020-03-19 ENCOUNTER — DOCUMENTATION ONLY (OUTPATIENT)
Dept: REHABILITATION | Facility: HOSPITAL | Age: 70
End: 2020-03-19

## 2020-03-19 NOTE — PROGRESS NOTES
Patient: Sunni No  Date: 3/19/2020  Diagnosis: R hip and back pain  MRN: [id number]    Spoke with patient due to therapy following updates regarding COVID-19 closely and taking every precaution to ensure the safety of our patients, staff and community.  In an abundance of caution and in an effort to help reduce risk and limit community spread, we have decided to temporarily postpone appointments for patients who may be at increased risk to attend in-person therapy or where therapy is not critically needed at this time. Plan of care and home exercise program were reviewed and patient has what they need to continue therapy at home. All patient questions were answered. Also stated to patient that we are exploring virtual methods of providing care and will be in touch over the next few weeks. Patient verbalized understanding to all.    OMAR Marmolejo  3/19/2020

## 2020-09-15 ENCOUNTER — DOCUMENTATION ONLY (OUTPATIENT)
Dept: REHABILITATION | Facility: HOSPITAL | Age: 70
End: 2020-09-15

## 2023-11-20 ENCOUNTER — DOCUMENT SCAN (OUTPATIENT)
Dept: HOME HEALTH SERVICES | Facility: HOSPITAL | Age: 73
End: 2023-11-20
Payer: MEDICARE

## 2024-05-15 DIAGNOSIS — M79.601 ARM PAIN, CENTRAL, RIGHT: Primary | ICD-10-CM

## 2024-05-29 ENCOUNTER — CLINICAL SUPPORT (OUTPATIENT)
Dept: REHABILITATION | Facility: HOSPITAL | Age: 74
End: 2024-05-29
Payer: OTHER GOVERNMENT

## 2024-05-29 DIAGNOSIS — R29.898 DECREASED STRENGTH OF UPPER EXTREMITY: ICD-10-CM

## 2024-05-29 DIAGNOSIS — M25.611 DECREASED ROM OF RIGHT SHOULDER: ICD-10-CM

## 2024-05-29 PROCEDURE — 97162 PT EVAL MOD COMPLEX 30 MIN: CPT

## 2024-05-29 PROCEDURE — 97112 NEUROMUSCULAR REEDUCATION: CPT

## 2024-05-29 PROCEDURE — 97140 MANUAL THERAPY 1/> REGIONS: CPT

## 2024-06-06 PROBLEM — M25.611 DECREASED ROM OF RIGHT SHOULDER: Status: ACTIVE | Noted: 2024-06-06

## 2024-06-06 PROBLEM — R29.898 DECREASED STRENGTH OF UPPER EXTREMITY: Status: ACTIVE | Noted: 2024-06-06

## 2024-06-06 NOTE — PLAN OF CARE
[facility]   Physical Therapy Initial Evaluation      Name: Sunni No  Clinic Number: [id number]    Therapy Diagnosis:   Encounter Diagnoses   Name Primary?    Decreased ROM of right shoulder     Decreased strength of upper extremity         Physician: Mary Lima MD    Physician Orders: PT Eval and Treat   Medical Diagnosis from Referral: M79.601 (ICD-10-CM) - Arm pain, central, right  Evaluation Date: 5/29/2024  Authorization Period Expiration: 5/15/2024  Plan of Care Expiration: 7/31/2024  Progress Note Due: 10th visit  Date of Surgery: NA  Visit # / Visits authorized: 1/ 1   FOTO: 1/ 3    Precautions: Standard     Time In: 12:30 PM  Time Out: 1:30 PM  Total Billable Time: 60 minutes    Subjective     Date of onset: Almost one year    History of current condition - Sunni reports to the clinic with complaints of upper arm pain that started almost one year ago. Patient states that she was in the hospital for a surgery on July 7th and had to get a PICC line. She states that she had the PICC line for about 3-4 weeks but started noticing the pain after 2 weeks. She was doing physical therapy in the hospital and then had home health but mainly this was focused on her leg strength and not as much with her arm. She states that her PCP said to wait about 6 months for the arm to heal from the PICC line. When she went back to the MD, she mentioned that her arm was still hurting so he put in orders for physical therapy. She states that prior to her hospitalization, she did not have any problems. Pain is worse lifting the arm, across the body, and reaching behind her back. She denies numbness and tingling. She also reports new onset foot pain and plans to see the MD about this.     Falls: No falls reported.     Imaging: See imaging section     Prior Therapy: Yes  Social History:  lives alone  Occupation: Retired  Prior Level of Function: independent with activities of daily  "living/instrumental activities of daily living   Current Level of Function: Difficulty with reaching, lifting, dressing and grooming tasks    Pain:  Current 0/10, worst 7/10, best 0/10   Location: right shoulder    Description: Aching, Grabbing, Tight, and Sharp  Aggravating Factors: Lifting and reaching, grooming and self care tasks  Easing Factors: rest    Patients goals: "Help with the pain"     Medical History:   Past Medical History:   Diagnosis Date    Ulcer        Surgical History:   Sunni No  has no past surgical history on file.    Medications:   Sunni has a current medication list which includes the following prescription(s): dicyclomine and tramadol.    Allergies:   Review of patient's allergies indicates:  Not on File     Objective      Observation: Patient presents to the clinic with slight antalgic gait pattern due to new onset heel pain    Posture: Forward head and rounded shoulders    Passive Range of Motion:   Shoulder Right Left   Flexion 165 WNL   Abduction 145 WNL   ER at 0 25 WNL   ER at 90 45 WNL   IR  45 WNL      Active Range of Motion:   Shoulder Right Left   Flexion 170* 180   Abduction 160* 180   ER at 0 15* WNL   IR 50 WNL   Reach behind head C6* C6   Reach behind back  L5* T7     Strength:  Shoulder Right Left   Flexion 3+/5* 4/5   Abduction 3+/5* 4/5   ER 3+/5* 4/5   IR 4/5 5/5     Special Tests:  Impingement Cluster:   Right Left   Hawkin's Kenndy Pos Neg   Painful Arc Pos Neg   Resisted ER Pos Neg     Rotator Cuff Tear   Right Left   Hawkin's Tai Pos Neg   Drop Arm test Neg Neg   Resisted ER Pos Neg     Instability:   Right Left   Anterior Apprehension Test Neg Neg   Relocation test Neg Neg   Posterior Apprehension Test Neg Neg   Sulcus Sign Neg Neg     Other:   Right Left   Subscapularis Lift Off Test Neg Neg   Empty Can Pos Neg     Joint Mobility: Hypermobile    Palpation: Tenderness at bicep tendon groove, supra insertion    Sensation: Intact    Intake Outcome " Measure for FOTO Shoulder Survey    Therapist reviewed FOTO scores for Sunni No on 5/29/2024.   FOTO report - see Media section or FOTO account episode details.    Intake Score: See media section          Treatment     Total Treatment time (time-based codes) separate from Evaluation: 30 minutes     Sunni received the treatments listed below:      therapeutic exercises: to develop strength, endurance, ROM, flexibility, posture, and core stabilization for 00 minutes including:     manual therapy techniques: Joint mobilizations and Manual traction were applied to the: cervical/thoracic spine for 10 minutes, including:   Grade I-III GHJ mobilizations for pain relief  Passive range of motion in all planes    neuromuscular re-education activities: to improve Balance, Coordination, Kinesthetic, Sense, Proprioception, Posture, and Motor Control for 20 minutes, including:   Education on activity modification, plan of care, home exercise program, and functional anatomy  Scapular retraction 5 sec hold x 20 reps  Supine shoulder flexion with cane 5 sec hold x 20 reps  Shoulder isometrics (internal rotation, external rotation, flexion) 10 sec hold x 10 reps each    therapeutic activities: to improve functional performance for 00 minutes, including:    Patient Education and Home Exercises     Education provided:   - Activity modification  - Plan of care  - Home exercise program  - Functional Anatomy    Written Home Exercises Provided: yes. Exercises were reviewed and Sunni was able to demonstrate them prior to the end of the session.  Sunni demonstrated good  understanding of the education provided. See EMR under Patient Instructions for exercises provided during therapy sessions.    Assessment     Sunni is a 73 y.o. female referred to outpatient Physical Therapy with a medical diagnosis of M79.601 (ICD-10-CM) - Arm pain, central, right. Patient presents with painful shoulder range of motion, gross upper  extremity strength, and poor periscapular motor control. Based on subjective and objective assessment, possible rotator cuff pathology and will begin conservative care at this time. Will reassess and determine if referral is appropriate. Patient is functionally limited with reaching, lifting, self care, and grooming tasks. Patient prognosis is Fair.   Patient will benefit from skilled outpatient Physical Therapy to address the deficits stated above and in the chart below, provide patient /family education, and to maximize patientt's level of independence.     Plan of care discussed with patient: Yes  Patient's spiritual, cultural and educational needs considered and patient is agreeable to the plan of care and goals as stated below:     Anticipated Barriers for therapy: Age, Therapy consistency, lifestyle    Medical Necessity is demonstrated by the following  History  Co-morbidities and personal factors that may impact the plan of care [] LOW: no personal factors / co-morbidities  [] MODERATE: 1-2 personal factors / co-morbidities  [x] HIGH: 3+ personal factors / co-morbidities    Moderate / High Support Documentation:   Co-morbidities affecting plan of care: see PMH    Personal Factors:   age  lifestyle     Examination  Body Structures and Functions, activity limitations and participation restrictions that may impact the plan of care [] LOW: addressing 1-2 elements  [] MODERATE: 3+ elements  [x] HIGH: 4+ elements (please support below)    Moderate / High Support Documentation: range of motion, strength, motor control, mobility     Clinical Presentation [] LOW: stable  [x] MODERATE: Evolving  [] HIGH: Unstable     Decision Making/ Complexity Score: moderate         Goals:  Short Term Goals: 4 weeks   1. Patient will reduce maximal pain rating to < 3/10 pain to facilitate ability to sleep through the night and recover from PT interventions.  2. Patient will demonstrate > 120 degrees flexion to facilitate ability to  perform overhead activities.    3. Patient will demonstrate < 3/10 pain with self care tasks.     Long Term Goals: 8-10 weeks   1. Patient will demonstrate >/= 4/5 upper quarter and periscapular strength to facilitate pain free lifting tasks.   2. Patient will demonstrate > 60 degrees external rotation to facilitate grooming tasks without restrictions.   3. Patient will demonstrate > 60 degrees internal rotation to facilitate dressing tasks without restrictions.   4. Patient will demonstrate > 170 degrees flexion to facilitate ability to perform overhead activities.  5. Patient will be able to lift at least 5 lbs over head with < 3/10 pain to facilitate lifting tasks.      Plan     Plan of care Certification: 5/29/2024 to 7/31/2024.    Outpatient Physical Therapy 2 times weekly for 8 weeks to include the following interventions: Cervical/Lumbar Traction, Manual Therapy, Moist Heat/ Ice, Neuromuscular Re-ed, Patient Education, Self Care, Therapeutic Activities, and Therapeutic Exercise.     Ciera Reynolds PT, DPT      Physician's Signature: _________________________________________ Date: ________________

## 2024-06-07 ENCOUNTER — CLINICAL SUPPORT (OUTPATIENT)
Dept: REHABILITATION | Facility: HOSPITAL | Age: 74
End: 2024-06-07
Payer: OTHER GOVERNMENT

## 2024-06-07 DIAGNOSIS — M25.611 DECREASED ROM OF RIGHT SHOULDER: Primary | ICD-10-CM

## 2024-06-07 DIAGNOSIS — R29.898 DECREASED STRENGTH OF UPPER EXTREMITY: ICD-10-CM

## 2024-06-07 PROCEDURE — 97140 MANUAL THERAPY 1/> REGIONS: CPT | Mod: CQ

## 2024-06-07 PROCEDURE — 97112 NEUROMUSCULAR REEDUCATION: CPT | Mod: CQ

## 2024-06-11 ENCOUNTER — CLINICAL SUPPORT (OUTPATIENT)
Dept: REHABILITATION | Facility: HOSPITAL | Age: 74
End: 2024-06-11
Payer: OTHER GOVERNMENT

## 2024-06-11 DIAGNOSIS — R29.898 DECREASED STRENGTH OF UPPER EXTREMITY: ICD-10-CM

## 2024-06-11 DIAGNOSIS — M25.611 DECREASED ROM OF RIGHT SHOULDER: Primary | ICD-10-CM

## 2024-06-11 PROCEDURE — 97140 MANUAL THERAPY 1/> REGIONS: CPT

## 2024-06-11 PROCEDURE — 97112 NEUROMUSCULAR REEDUCATION: CPT

## 2024-06-13 ENCOUNTER — CLINICAL SUPPORT (OUTPATIENT)
Dept: REHABILITATION | Facility: HOSPITAL | Age: 74
End: 2024-06-13
Payer: OTHER GOVERNMENT

## 2024-06-13 DIAGNOSIS — M25.611 DECREASED ROM OF RIGHT SHOULDER: Primary | ICD-10-CM

## 2024-06-13 DIAGNOSIS — R29.898 DECREASED STRENGTH OF UPPER EXTREMITY: ICD-10-CM

## 2024-06-13 PROCEDURE — 97112 NEUROMUSCULAR REEDUCATION: CPT

## 2024-06-13 PROCEDURE — 97140 MANUAL THERAPY 1/> REGIONS: CPT

## 2024-06-25 ENCOUNTER — CLINICAL SUPPORT (OUTPATIENT)
Dept: REHABILITATION | Facility: HOSPITAL | Age: 74
End: 2024-06-25
Payer: OTHER GOVERNMENT

## 2024-06-25 DIAGNOSIS — R29.898 DECREASED STRENGTH OF UPPER EXTREMITY: ICD-10-CM

## 2024-06-25 DIAGNOSIS — M25.611 DECREASED ROM OF RIGHT SHOULDER: Primary | ICD-10-CM

## 2024-06-25 PROCEDURE — 97112 NEUROMUSCULAR REEDUCATION: CPT | Mod: CQ

## 2024-06-25 NOTE — PROGRESS NOTES
"[facility]   Physical Therapy Treatment Note      [facility]  [id number]    Therapy Diagnosis:   Encounter Diagnoses   Name Primary?    Decreased ROM of right shoulder Yes    Decreased strength of upper extremity      Physician: Mary Lima MD    Visit Date: 6/13/2024    Physician Orders: PT Eval and Treat   Medical Diagnosis from Referral: M79.601 (ICD-10-CM) - Arm pain, central, right  Evaluation Date: 5/29/2024  Authorization Period Expiration: 5/15/2024  Plan of Care Expiration: 7/31/2024  Progress Note Due: 10th visit  Date of Surgery: NA  Visit # / Visits authorized: Eval + 3/ 20   PTA Visit #: 1/5     FOTO: 1/ 3      Time In: 11:00 AM  Time Out: 11:55 AM  Total Billable Time: 30 minutes    Precautions: Standard     Subjective     Patient reports: "I feel like I am getting better"  She was compliant with home exercise program.  Response to previous treatment: no adverse effects  Functional change: ongoing    Pain: 4/10  Location: right upper arm      Objective      Objective Measures updated at progress report unless specified.     Treatment     Sunni received the treatments listed below:      therapeutic exercises: to develop strength, endurance, ROM, flexibility, posture, and core stabilization for 9 minutes including:   Pulley flexion/scaption/abduction 3' each direction    manual therapy techniques: Joint mobilizations and Manual traction were applied to the: cervical/thoracic spine for 10 minutes, including:   Grade I-III GHJ mobilizations for pain relief  Passive range of motion in all planes     neuromuscular re-education activities: to improve Balance, Coordination, Kinesthetic, Sense, Proprioception, Posture, and Motor Control for 35 minutes, including:   Education on activity modification, plan of care, home exercise program, and functional anatomy  Supine shoulder flexion with 2# dowel 5 sec hold x 30 reps  Supine shoulder ER dowel x20 " with 5 sec hold  Scapular retraction 5 sec hold x 30 reps  Shoulder isometrics (internal rotation, external rotation, flexion, abduction) 10 sec hold x 10 reps each  Rows with red tb: 2x10 with 5 sec hold  Shoulder extensions with red tb: 2x10 with 5 sec hold    therapeutic activities: to improve functional performance for 00 minutes, including:      Patient Education and Home Exercises       Education provided:   - Patient was educated on all therapeutic interventions performed during today's treatment visit.     Written Home Exercises Provided: Patient instructed to cont prior HEP. Exercises were reviewed and Sunni was able to demonstrate them prior to the end of the session.  Sunni demonstrated good  understanding of the education provided. See Electronic Medical Record under Patient Instructions for exercises provided during therapy sessions    Assessment     Patient continues to demonstrate significant improvement in range of motion in all planes as compared to initial evaluation. Added pulley flexion, scaption, and abduction to work into end ranges as this remains her pain. Patient able to progress with scapular stabilization this date with good tolerance. Patient appropriate for progressions in load, intensity, and duration next session pending subjective reports. Will progress as tolerated.     Patient prognosis is Fair.     Patient will continue to benefit from skilled outpatient physical therapy to address the deficits listed in the problem list box on initial evaluation, provide pt/family education and to maximize pt's level of independence in the home and community environment.     Patient's spiritual, cultural and educational needs considered and pt agreeable to plan of care and goals.     Anticipated barriers to physical therapy: Age, Therapy consistency, lifestyle     Goals:   Short Term Goals: 4 weeks (Ongoing, Not Met)  1. Patient will reduce maximal pain rating to < 3/10 pain to facilitate  ability to sleep through the night and recover from PT interventions.  2. Patient will demonstrate > 120 degrees flexion to facilitate ability to perform overhead activities.    3. Patient will demonstrate < 3/10 pain with self care tasks.      Long Term Goals: 8-10 weeks (Ongoing, Not Met)  1. Patient will demonstrate >/= 4/5 upper quarter and periscapular strength to facilitate pain free lifting tasks.   2. Patient will demonstrate > 60 degrees external rotation to facilitate grooming tasks without restrictions.   3. Patient will demonstrate > 60 degrees internal rotation to facilitate dressing tasks without restrictions.   4. Patient will demonstrate > 170 degrees flexion to facilitate ability to perform overhead activities.  5. Patient will be able to lift at least 5 lbs over head with < 3/10 pain to facilitate lifting tasks.      Plan     Plan of care Certification: 5/29/2024 to 7/31/2024.     Outpatient Physical Therapy 2 times weekly for 8 weeks to include the following interventions: Cervical/Lumbar Traction, Manual Therapy, Moist Heat/ Ice, Neuromuscular Re-ed, Patient Education, Self Care, Therapeutic Activities, and Therapeutic Exercise.     Ciera Reynolds, PT, DPT

## 2024-06-25 NOTE — PROGRESS NOTES
"[facility]   Physical Therapy Treatment Note      Name: Sunni Andrade  Clinic Number: [id number]    Therapy Diagnosis:   Encounter Diagnoses   Name Primary?    Decreased ROM of right shoulder Yes    Decreased strength of upper extremity      Physician: Mary Lima MD    Visit Date: 6/11/2024    Physician Orders: PT Eval and Treat   Medical Diagnosis from Referral: M79.601 (ICD-10-CM) - Arm pain, central, right  Evaluation Date: 5/29/2024  Authorization Period Expiration: 5/15/2024  Plan of Care Expiration: 7/31/2024  Progress Note Due: 10th visit  Date of Surgery: NA  Visit # / Visits authorized: Eval + 2/ 20   PTA Visit #: 1/5     FOTO: 1/ 3      Time In: 11:00 AM  Time Out: 11:55 AM  Total Billable Time: 55 minutes    Precautions: Standard     Subjective     Patient reports: "My shoulder is able to move more"  She was compliant with home exercise program.  Response to previous treatment: no adverse effects  Functional change: ongoing    Pain: 4/10  Location: right upper arm      Objective      Objective Measures updated at progress report unless specified.     Treatment     Sunni received the treatments listed below:      therapeutic exercises: to develop strength, endurance, ROM, flexibility, posture, and core stabilization for 00 minutes including:      manual therapy techniques: Joint mobilizations and Manual traction were applied to the: cervical/thoracic spine for 10 minutes, including:   Grade I-III GHJ mobilizations for pain relief  Passive range of motion in all planes     neuromuscular re-education activities: to improve Balance, Coordination, Kinesthetic, Sense, Proprioception, Posture, and Motor Control for 45 minutes, including:   Education on activity modification, plan of care, home exercise program, and functional anatomy  Supine shoulder flexion with cane 5 sec hold x 20 reps  Supine shoulder ER dowel x20 with 5 sec hold  Scapular retraction 5 sec hold x 20 " reps  Shoulder isometrics (internal rotation, external rotation, flexion, abduction) 10 sec hold x 10 reps each  Rows with yellow tb: 1x10 with 5 sec hold  Shoulder extensions with yellow tb: 1x10 with 5 sec hold    therapeutic activities: to improve functional performance for 00 minutes, including:      Patient Education and Home Exercises       Education provided:   - Patient was educated on all therapeutic interventions performed during today's treatment visit.     Written Home Exercises Provided: Patient instructed to cont prior HEP. Exercises were reviewed and Sunni was able to demonstrate them prior to the end of the session.  Sunni demonstrated good  understanding of the education provided. See Electronic Medical Record under Patient Instructions for exercises provided during therapy sessions    Assessment     Sunni continues to work hard in therapy and focus on periscapular strengthening to promote improvement with overall gross upper extremity strengthening for functional movements. She was able to tolerate all exercise interventions with good tolerance. Significant improvement with overall range of motion and pain only at end range. Will progress as tolerated.     Patient prognosis is Fair.     Patient will continue to benefit from skilled outpatient physical therapy to address the deficits listed in the problem list box on initial evaluation, provide pt/family education and to maximize pt's level of independence in the home and community environment.     Patient's spiritual, cultural and educational needs considered and pt agreeable to plan of care and goals.     Anticipated barriers to physical therapy: Age, Therapy consistency, lifestyle     Goals:   Short Term Goals: 4 weeks (Ongoing, Not Met)  1. Patient will reduce maximal pain rating to < 3/10 pain to facilitate ability to sleep through the night and recover from PT interventions.  2. Patient will demonstrate > 120 degrees flexion to  facilitate ability to perform overhead activities.    3. Patient will demonstrate < 3/10 pain with self care tasks.      Long Term Goals: 8-10 weeks (Ongoing, Not Met)  1. Patient will demonstrate >/= 4/5 upper quarter and periscapular strength to facilitate pain free lifting tasks.   2. Patient will demonstrate > 60 degrees external rotation to facilitate grooming tasks without restrictions.   3. Patient will demonstrate > 60 degrees internal rotation to facilitate dressing tasks without restrictions.   4. Patient will demonstrate > 170 degrees flexion to facilitate ability to perform overhead activities.  5. Patient will be able to lift at least 5 lbs over head with < 3/10 pain to facilitate lifting tasks.      Plan     Plan of care Certification: 5/29/2024 to 7/31/2024.     Outpatient Physical Therapy 2 times weekly for 8 weeks to include the following interventions: Cervical/Lumbar Traction, Manual Therapy, Moist Heat/ Ice, Neuromuscular Re-ed, Patient Education, Self Care, Therapeutic Activities, and Therapeutic Exercise.     Ciera Reynolds, PT, DPT

## 2024-06-25 NOTE — PROGRESS NOTES
[facility]   Physical Therapy Treatment Note      [facility]  [facility] Number[facility] [id number]    Therapy Diagnosis:   Encounter Diagnoses   Name Primary?    Decreased ROM of right shoulder Yes    Decreased strength of upper extremity      Physician: Mary Lima MD    Visit Date: 6/25/2024    Physician Orders: PT Eval and Treat   Medical Diagnosis from Referral: M79.601 (ICD-10-CM) - Arm pain, central, right  Evaluation Date: 5/29/2024  Authorization Period Expiration: 5/15/2024  Plan of Care Expiration: 7/31/2024  Progress Note Due: 10th visit  Date of Surgery: NA  Visit # / Visits authorized: Eval + 4/ 20   PTA Visit #: 1/5     FOTO: 1/ 3      Time In: 11:10 AM  Time Out: 12:05 AM  Total Billable Time: 55 minutes    Precautions: Standard     Subjective     Patient reports: that her left shoulder has been hurting lately.   She was compliant with home exercise program.  Response to previous treatment: no adverse effects  Functional change: ongoing    Pain: 5/10  Location: right upper arm      Objective      Objective Measures updated at progress report unless specified.     Treatment     Sunni received the treatments listed below:      therapeutic exercises: to develop strength, endurance, ROM, flexibility, posture, and core stabilization for 00 minutes including:      manual therapy techniques: Joint mobilizations and Manual traction were applied to the: cervical/thoracic spine for 0 minutes, including:   Grade I-III GHJ mobilizations for pain relief  Passive range of motion in all planes     neuromuscular re-education activities: to improve Balance, Coordination, Kinesthetic, Sense, Proprioception, Posture, and Motor Control for 55 minutes, including:   Education on activity modification, plan of care, home exercise program, and functional anatomy  Supine shoulder flexion with cane 5 sec hold x 20 reps  Supine shoulder ER dowel x20 with 5 sec hold  Scapular retraction 5  sec hold x 20 reps  Rows with yellow tb: 3x10 with 5 sec hold  Shoulder extensions with yellow tb: 3x10 with 5 sec hold  Shoulder isometrics (internal rotation, external rotation, flexion, abduction) 10 sec hold x 10 reps each      therapeutic activities: to improve functional performance for 00 minutes, including:      Patient Education and Home Exercises       Education provided:   - Patient was educated on all therapeutic interventions performed during today's treatment visit.     Written Home Exercises Provided: Patient instructed to cont prior HEP. Exercises were reviewed and Sunni was able to demonstrate them prior to the end of the session.  Sunni demonstrated good  understanding of the education provided. See Electronic Medical Record under Patient Instructions for exercises provided during therapy sessions    Assessment     Patient continues to demonstrate significant improvement in range of motion in all planes as compared to initial evaluation. Patient able to continue with scapular stabilization this date with good tolerance. Patient appropriate for progressions in load, intensity, and duration next session pending subjective reports. Will progress as tolerated.     Patient prognosis is Fair.     Patient will continue to benefit from skilled outpatient physical therapy to address the deficits listed in the problem list box on initial evaluation, provide pt/family education and to maximize pt's level of independence in the home and community environment.     Patient's spiritual, cultural and educational needs considered and pt agreeable to plan of care and goals.     Anticipated barriers to physical therapy: Age, Therapy consistency, lifestyle     Goals:   Short Term Goals: 4 weeks   1. Patient will reduce maximal pain rating to < 3/10 pain to facilitate ability to sleep through the night and recover from PT interventions.  2. Patient will demonstrate > 120 degrees flexion to facilitate ability to  perform overhead activities.    3. Patient will demonstrate < 3/10 pain with self care tasks.      Long Term Goals: 8-10 weeks   1. Patient will demonstrate >/= 4/5 upper quarter and periscapular strength to facilitate pain free lifting tasks.   2. Patient will demonstrate > 60 degrees external rotation to facilitate grooming tasks without restrictions.   3. Patient will demonstrate > 60 degrees internal rotation to facilitate dressing tasks without restrictions.   4. Patient will demonstrate > 170 degrees flexion to facilitate ability to perform overhead activities.  5. Patient will be able to lift at least 5 lbs over head with < 3/10 pain to facilitate lifting tasks.      Plan     Plan of care Certification: 5/29/2024 to 7/31/2024.     Outpatient Physical Therapy 2 times weekly for 8 weeks to include the following interventions: Cervical/Lumbar Traction, Manual Therapy, Moist Heat/ Ice, Neuromuscular Re-ed, Patient Education, Self Care, Therapeutic Activities, and Therapeutic Exercise.     Gab Healy, PTA

## 2024-07-02 ENCOUNTER — CLINICAL SUPPORT (OUTPATIENT)
Dept: REHABILITATION | Facility: HOSPITAL | Age: 74
End: 2024-07-02
Payer: OTHER GOVERNMENT

## 2024-07-02 DIAGNOSIS — M25.611 DECREASED ROM OF RIGHT SHOULDER: Primary | ICD-10-CM

## 2024-07-02 DIAGNOSIS — R29.898 DECREASED STRENGTH OF UPPER EXTREMITY: ICD-10-CM

## 2024-07-02 PROCEDURE — 97112 NEUROMUSCULAR REEDUCATION: CPT | Mod: CQ

## 2024-07-02 NOTE — PROGRESS NOTES
[facility]   Physical Therapy Treatment Note      [facility]  [facility] Number: [id number]    Therapy Diagnosis:   Encounter Diagnoses   Name Primary?    Decreased ROM of right shoulder Yes    Decreased strength of upper extremity        Physician: Mary Lima MD    Visit Date: 7/2/2024    Physician Orders: PT Eval and Treat   Medical Diagnosis from Referral: M79.601 (ICD-10-CM) - Arm pain, central, right  Evaluation Date: 5/29/2024  Authorization Period Expiration: 5/15/2024  Plan of Care Expiration: 7/31/2024  Progress Note Due: 10th visit  Date of Surgery: NA  Visit # / Visits authorized: Eval + 5/ 20   PTA Visit #: 2/5     FOTO: 1/ 3      Time In: 11:10 AM  Time Out: 12:05 AM  Total Billable Time: 55 minutes    Precautions: Standard     Subjective     Patient reports: that she has been experiencing less shoulder pain since start of care.   She was compliant with home exercise program.  Response to previous treatment: no adverse effects  Functional change: ongoing    Pain: 2-3/10  Location: right upper arm      Objective      Objective Measures updated at progress report unless specified.     Treatment     Sunni received the treatments listed below:      therapeutic exercises: to develop strength, endurance, ROM, flexibility, posture, and core stabilization for 00 minutes including:      manual therapy techniques: Joint mobilizations and Manual traction were applied to the: cervical/thoracic spine for 0 minutes, including:   Grade I-III GHJ mobilizations for pain relief  Passive range of motion in all planes     neuromuscular re-education activities: to improve Balance, Coordination, Kinesthetic, Sense, Proprioception, Posture, and Motor Control for 55 minutes, including:   Education on activity modification, plan of care, home exercise program, and functional anatomy  Supine shoulder flexion with cane 5 sec hold x 15 reps  Supine shoulder ER dowel x15 with 5 sec  hold  Scapular retraction 5 sec hold x 15 reps  Rows with yellow tb: 2x10 with 5 sec hold  Shoulder extensions with yellow tb: 2x10 with 5 sec hold  Shoulder isometrics (internal rotation, external rotation, flexion, abduction) 10 sec hold x 10 reps each      therapeutic activities: to improve functional performance for 00 minutes, including:      Patient Education and Home Exercises       Education provided:   - Patient was educated on all therapeutic interventions performed during today's treatment visit.     Written Home Exercises Provided: Patient instructed to cont prior HEP. Exercises were reviewed and Sunni was able to demonstrate them prior to the end of the session.  Sunni demonstrated good  understanding of the education provided. See Electronic Medical Record under Patient Instructions for exercises provided during therapy sessions    Assessment     Patient continues to demonstrate significant improvement in range of motion in all planes as compared to initial evaluation. Patient able to continue with scapular stabilization this date with good tolerance. Patient appropriate for progressions in load, intensity, and duration next session pending subjective reports. Will progress as tolerated.     Patient prognosis is Fair.     Patient will continue to benefit from skilled outpatient physical therapy to address the deficits listed in the problem list box on initial evaluation, provide pt/family education and to maximize pt's level of independence in the home and community environment.     Patient's spiritual, cultural and educational needs considered and pt agreeable to plan of care and goals.     Anticipated barriers to physical therapy: Age, Therapy consistency, lifestyle     Goals:   Short Term Goals: 4 weeks   1. Patient will reduce maximal pain rating to < 3/10 pain to facilitate ability to sleep through the night and recover from PT interventions.  2. Patient will demonstrate > 120 degrees flexion  to facilitate ability to perform overhead activities.    3. Patient will demonstrate < 3/10 pain with self care tasks.      Long Term Goals: 8-10 weeks   1. Patient will demonstrate >/= 4/5 upper quarter and periscapular strength to facilitate pain free lifting tasks.   2. Patient will demonstrate > 60 degrees external rotation to facilitate grooming tasks without restrictions.   3. Patient will demonstrate > 60 degrees internal rotation to facilitate dressing tasks without restrictions.   4. Patient will demonstrate > 170 degrees flexion to facilitate ability to perform overhead activities.  5. Patient will be able to lift at least 5 lbs over head with < 3/10 pain to facilitate lifting tasks.      Plan     Plan of care Certification: 5/29/2024 to 7/31/2024.     Outpatient Physical Therapy 2 times weekly for 8 weeks to include the following interventions: Cervical/Lumbar Traction, Manual Therapy, Moist Heat/ Ice, Neuromuscular Re-ed, Patient Education, Self Care, Therapeutic Activities, and Therapeutic Exercise.     Gab Healy, PTA

## 2024-07-11 ENCOUNTER — CLINICAL SUPPORT (OUTPATIENT)
Dept: REHABILITATION | Facility: HOSPITAL | Age: 74
End: 2024-07-11
Payer: OTHER GOVERNMENT

## 2024-07-11 DIAGNOSIS — M25.611 DECREASED ROM OF RIGHT SHOULDER: Primary | ICD-10-CM

## 2024-07-11 DIAGNOSIS — R29.898 DECREASED STRENGTH OF UPPER EXTREMITY: ICD-10-CM

## 2024-07-11 PROCEDURE — 97530 THERAPEUTIC ACTIVITIES: CPT

## 2024-07-11 PROCEDURE — 97112 NEUROMUSCULAR REEDUCATION: CPT

## 2024-07-11 NOTE — PROGRESS NOTES
[facility]   Physical Therapy Treatment Note/ Reassessment     Name: Sunni No  Clinic Number: [id number]    Therapy Diagnosis:   Encounter Diagnoses   Name Primary?    Decreased ROM of right shoulder Yes    Decreased strength of upper extremity        Physician: Mary Lima MD    Visit Date: 7/11/2024    Physician Orders: PT Eval and Treat   Medical Diagnosis from Referral: M79.601 (ICD-10-CM) - Arm pain, central, right  Evaluation Date: 5/29/2024  Authorization Period Expiration: 5/15/2024  Plan of Care Expiration: 7/31/2024  Progress Note Due: 10th visit  Date of Surgery: NA  Visit # / Visits authorized: Eval + 6/ 20   PTA Visit #: 2/5     FOTO: 2/ 3      Time In: 11:00 AM  Time Out: 11:55 AM  Total Billable Time: 40 minutes    Precautions: Standard     Subjective     Patient reports: that she is doing much better and is able to reach her arm overhead her head and perform all of her tasks. She reports a significant reduction in pain and only having difficulty with holding a heavy pot of water while cooking. She states that she has new orders for her foot and would really like to focus on this more than the shoulder since it has gotten better.   She was compliant with home exercise program.  Response to previous treatment: no adverse effects  Functional change: Able to perform all activities of daily living/instrumental activities of daily living     Pain: 2-3/10  Location: right upper arm      Objective      Observation: Patient presents to the clinic with slight antalgic gait pattern due to heel pain.      Posture: Forward head and rounded shoulders     Passive Range of Motion:   Shoulder Right Left   Flexion WNL WNL   Abduction WNL WNL   ER at 0 WNL WNL   ER at 90 WNL WNL   IR  WNL WNL      Active Range of Motion: * pain only at end range  Shoulder Right Left   Flexion 180* 180   Abduction 180* 180   ER at 0 WNL WNL   IR WNL WNL   Reach behind head C6 C6   Reach behind  back  T12* T7      Strength:  Shoulder Right Left   Flexion 3+/5 4/5   Abduction 3+/5 4/5   ER 3+/5 4/5   IR 4/5 5/5      Special Tests:  Impingement Cluster:    Right Left   Moe Ha Pos Neg   Painful Arc Neg Neg   Resisted ER Neg Neg      Rotator Cuff Tear    Right Left   Moe Lujan Pos Neg   Drop Arm test Neg Neg   Resisted ER Neg Neg      Instability:    Right Left   Anterior Apprehension Test Neg Neg   Relocation test Neg Neg   Posterior Apprehension Test Neg Neg   Sulcus Sign Neg Neg      Other:    Right Left   Subscapularis Lift Off Test Neg Neg   Empty Can Neg Neg      Joint Mobility: Hypermobile     Palpation: Tenderness at bicep tendon groove, supra insertion     Sensation: Intact    Treatment     Sunni received the treatments listed below:      therapeutic exercises: to develop strength, endurance, ROM, flexibility, posture, and core stabilization for 00 minutes including:      manual therapy techniques: Joint mobilizations and Manual traction were applied to the: cervical/thoracic spine for 0 minutes, including:      neuromuscular re-education activities: to improve Balance, Coordination, Kinesthetic, Sense, Proprioception, Posture, and Motor Control for 40 minutes, including:   Education on activity modification, plan of care, home exercise program, and functional anatomy  Supine shoulder flexion with cane 5 sec hold x 15 reps  Supine shoulder ER dowel x15 with 5 sec hold  Scapular retraction 5 sec hold x 15 reps  Rows with yellow tb: 2x10 with 5 sec hold  Shoulder extensions with yellow tb: 2x10 with 5 sec hold  Shoulder isometrics (internal rotation, external rotation, flexion, abduction) 10 sec hold x 10 reps each    therapeutic activities: to improve functional performance for 15 minutes, including:  Reassessment    Patient Education and Home Exercises       Education provided:   - Patient was educated on all therapeutic interventions performed during today's treatment visit.      Written Home Exercises Provided: Patient instructed to cont prior HEP. Exercises were reviewed and Sunni was able to demonstrate them prior to the end of the session.  Sunni demonstrated good  understanding of the education provided. See Electronic Medical Record under Patient Instructions for exercises provided during therapy sessions    Assessment     Sunni has attended physical therapy for right shoulder pain for 6 visits and is progressing well with therapy interventions. She has demonstrated improvement with range of motion, strength, reaching and lifting tasks, and FOTO score. Patient's main functional limitation remains gross upper extremity strength as compared to normative values and contralateral side mostly limiting heavy lifting tasks. Patient requesting to continue till the end of the month and she plans to continue with home exercise program and go to the gym on her own. Patient has new complaint and referral for her foot pain and would like to discharge from her shoulder and begin focus on her foot at this time. Patient in agreement with plan above and will discharge at the end of the month.     Patient prognosis is Fair.     Patient will continue to benefit from skilled outpatient physical therapy to address the deficits listed in the problem list box on initial evaluation, provide pt/family education and to maximize pt's level of independence in the home and community environment.     Patient's spiritual, cultural and educational needs considered and pt agreeable to plan of care and goals.     Anticipated barriers to physical therapy: Age, Therapy consistency, lifestyle     Goals:   Short Term Goals: 4 weeks   1. Patient will reduce maximal pain rating to < 3/10 pain to facilitate ability to sleep through the night and recover from PT interventions.(Met)  2. Patient will demonstrate > 120 degrees flexion to facilitate ability to perform overhead activities.  (Met)  3. Patient will  demonstrate < 3/10 pain with self care tasks. (Met)     Long Term Goals: 8-10 weeks   1. Patient will demonstrate >/= 4/5 upper quarter and periscapular strength to facilitate pain free lifting tasks.(Ongoing, Not Met)   2. Patient will demonstrate > 60 degrees external rotation to facilitate grooming tasks without restrictions. (Met)  3. Patient will demonstrate > 60 degrees internal rotation to facilitate dressing tasks without restrictions. (Met)  4. Patient will demonstrate > 170 degrees flexion to facilitate ability to perform overhead activities.(Met)  5. Patient will be able to lift at least 5 lbs over head with < 3/10 pain to facilitate lifting tasks. (Ongoing, Not Met)    Plan     Plan of care Certification: 5/29/2024 to 7/31/2024.     Outpatient Physical Therapy 2 times weekly for 2 weeks to include the following interventions: Cervical/Lumbar Traction, Manual Therapy, Moist Heat/ Ice, Neuromuscular Re-ed, Patient Education, Self Care, Therapeutic Activities, and Therapeutic Exercise.     Ciera Reynolds, PT, DPT

## 2024-07-18 ENCOUNTER — CLINICAL SUPPORT (OUTPATIENT)
Dept: REHABILITATION | Facility: HOSPITAL | Age: 74
End: 2024-07-18
Payer: OTHER GOVERNMENT

## 2024-07-18 DIAGNOSIS — M25.611 DECREASED ROM OF RIGHT SHOULDER: Primary | ICD-10-CM

## 2024-07-18 DIAGNOSIS — R29.898 DECREASED STRENGTH OF UPPER EXTREMITY: ICD-10-CM

## 2024-07-18 PROCEDURE — 97112 NEUROMUSCULAR REEDUCATION: CPT | Mod: CQ

## 2024-07-18 NOTE — PROGRESS NOTES
[facility]   Physical Therapy Daily Treatment Note     [facility]  [id number]    Therapy Diagnosis:   Encounter Diagnoses   Name Primary?    Decreased ROM of right shoulder Yes    Decreased strength of upper extremity      Physician: Mary Lima MD    Visit Date: 7/18/2024    Physician Orders: PT Eval and Treat   Medical Diagnosis from Referral: M79.601 (ICD-10-CM) - Arm pain, central, right  Evaluation Date: 5/29/2024  Authorization Period Expiration: 5/15/2024  Plan of Care Expiration: 7/31/2024  Progress Note Due: 10th visit  Date of Surgery: NA  Visit # / Visits authorized: Eval + 7/ 20   PTA Visit #: 1/5     FOTO: 2/ 3      Time In: 11:10 AM  Time Out: 12:03 PM  Total Billable Time: 53 minutes    Precautions: Standard     Subjective     Patient reports: that she is doing much better and is able to reach her arm overhead her head and perform all of her tasks. She reports a significant reduction in pain and only having difficulty with holding a heavy pot of water while cooking. She states that she has new orders for her foot and would really like to focus on this more than the shoulder since it has gotten better.   She was compliant with home exercise program.  Response to previous treatment: no adverse effects  Functional change: Able to perform all activities of daily living/instrumental activities of daily living     Pain: 3-4/10  Location: right upper arm bicep and anterior shoulder    Objective      Objective Measures updated at progress report unless specified.     Treatment     Sunni received the treatments listed below:      therapeutic exercises: to develop strength, endurance, ROM, flexibility, posture, and core stabilization for 00 minutes including:      manual therapy techniques: Joint mobilizations and Manual traction were applied to the: cervical/thoracic spine for 0 minutes, including:      neuromuscular re-education activities: to  improve Balance, Coordination, Kinesthetic, Sense, Proprioception, Posture, and Motor Control for 53 minutes, including:   Education on activity modification, plan of care, home exercise program, and functional anatomy  UBE 6 min 3 min fwd + 3 min bkw  Supine shoulder flexion with cane 5 sec hold x 15 reps  Supine shoulder ER dowel x15 with 5 sec hold  Scapular retraction 5 sec hold x 15 reps  Rows with gren tb: 3x10 with 5 sec hold  Shoulder extensions with yellow tb: 3x10 with 5 sec hold  Shoulder isometrics (internal rotation, external rotation, flexion, abduction) 5 sec hold x 20 reps each    therapeutic activities: to improve functional performance for 15 minutes, including:    Patient Education and Home Exercises       Education provided:   - Patient was educated on all therapeutic interventions performed during today's treatment visit.     Written Home Exercises Provided: Patient instructed to cont prior HEP. Exercises were reviewed and Sunni was able to demonstrate them prior to the end of the session.  Sunni demonstrated good  understanding of the education provided. See Electronic Medical Record under Patient Instructions for exercises provided during therapy sessions    Assessment     Sunni has attended physical therapy for right shoulder pain for 7 visits and is progressing well with therapy interventions. She demonstrates improved range of motion and strength. Patient's main functional limitation remains gross upper extremity strength as has some difficulty lifting items overhead. Patient requesting to continue till the end of the month and she plans to continue with home exercise program and go to the gym on her own. Patient in agreement with plan above and will discharge at the end of the month.     Patient prognosis is Fair.     Patient will continue to benefit from skilled outpatient physical therapy to address the deficits listed in the problem list box on initial evaluation, provide  pt/family education and to maximize pt's level of independence in the home and community environment.     Patient's spiritual, cultural and educational needs considered and pt agreeable to plan of care and goals.     Anticipated barriers to physical therapy: Age, Therapy consistency, lifestyle     Goals:   Short Term Goals: 4 weeks   1. Patient will reduce maximal pain rating to < 3/10 pain to facilitate ability to sleep through the night and recover from PT interventions.(Met)  2. Patient will demonstrate > 120 degrees flexion to facilitate ability to perform overhead activities.  (Met)  3. Patient will demonstrate < 3/10 pain with self care tasks. (Met)     Long Term Goals: 8-10 weeks   1. Patient will demonstrate >/= 4/5 upper quarter and periscapular strength to facilitate pain free lifting tasks.(Ongoing, Not Met)   2. Patient will demonstrate > 60 degrees external rotation to facilitate grooming tasks without restrictions. (Met)  3. Patient will demonstrate > 60 degrees internal rotation to facilitate dressing tasks without restrictions. (Met)  4. Patient will demonstrate > 170 degrees flexion to facilitate ability to perform overhead activities.(Met)  5. Patient will be able to lift at least 5 lbs over head with < 3/10 pain to facilitate lifting tasks. (Ongoing, Not Met)    Plan     Plan of care Certification: 5/29/2024 to 7/31/2024.     Outpatient Physical Therapy 2 times weekly for 2 weeks to include the following interventions: Cervical/Lumbar Traction, Manual Therapy, Moist Heat/ Ice, Neuromuscular Re-ed, Patient Education, Self Care, Therapeutic Activities, and Therapeutic Exercise.     Gab Healy, PTA

## 2024-07-25 ENCOUNTER — CLINICAL SUPPORT (OUTPATIENT)
Dept: REHABILITATION | Facility: HOSPITAL | Age: 74
End: 2024-07-25
Payer: OTHER GOVERNMENT

## 2024-07-25 DIAGNOSIS — M25.611 DECREASED ROM OF RIGHT SHOULDER: Primary | ICD-10-CM

## 2024-07-25 DIAGNOSIS — R29.898 DECREASED STRENGTH OF UPPER EXTREMITY: ICD-10-CM

## 2024-07-25 PROCEDURE — 97112 NEUROMUSCULAR REEDUCATION: CPT | Mod: KX

## 2024-07-25 NOTE — PROGRESS NOTES
[facility]   Physical Therapy Daily Treatment Note/ Discharge Summary     [facility]  [facility] [id number]    Therapy Diagnosis:   Encounter Diagnoses   Name Primary?    Decreased ROM of right shoulder Yes    Decreased strength of upper extremity        Physician: Mary Lima MD    Visit Date: 7/25/2024    Physician Orders: PT Eval and Treat   Medical Diagnosis from Referral: M79.601 (ICD-10-CM) - Arm pain, central, right  Evaluation Date: 5/29/2024  Authorization Period Expiration: 5/15/2024  Plan of Care Expiration: 7/31/2024  Progress Note Due: 10th visit  Date of Surgery: NA  Visit # / Visits authorized: Eval + 8/ 20   PTA Visit #: 1/5     FOTO: 2/ 3      Time In: 11:00 AM  Time Out: 11:23 AM  Total Billable Time: 23 minutes    Precautions: Standard     Subjective     Patient reports: that she is very happy with how much better her shoulder is doing and is ready to start working on her foot.  She was compliant with home exercise program.  Response to previous treatment: no adverse effects  Functional change: Able to perform all activities of daily living/instrumental activities of daily living     Pain: 0-1/10  Location: right upper arm bicep and anterior shoulder    Objective      Objective Measures updated at progress report unless specified.     Treatment     Sunni received the treatments listed below:      therapeutic exercises: to develop strength, endurance, ROM, flexibility, posture, and core stabilization for 00 minutes including:      manual therapy techniques: Joint mobilizations and Manual traction were applied to the: cervical/thoracic spine for 0 minutes, including:      neuromuscular re-education activities: to improve Balance, Coordination, Kinesthetic, Sense, Proprioception, Posture, and Motor Control for 23 minutes, including:   Discharge Summary  Education on activity modification, plan of care, home exercise program, and functional  anatomy    therapeutic activities: to improve functional performance for 15 minutes, including:    Patient Education and Home Exercises       Education provided:   - Patient was educated on all therapeutic interventions performed during today's treatment visit.     Written Home Exercises Provided: Patient instructed to cont prior HEP. Exercises were reviewed and Sunni was able to demonstrate them prior to the end of the session.  Sunni demonstrated good  understanding of the education provided. See Electronic Medical Record under Patient Instructions for exercises provided during therapy sessions    Assessment     Sunni has attended physical therapy for 8 visits for shoulder pain and has met 7 of 8 goals. She has demonstrated improvement with range of motion, strength, FOTO score, reaching and lifting tasks. Patient's main functional limitation remains gross upper extremity strength. Patient is appropriate for discharge at this time and plans to continue with progressions made during therapy. Patient provided comprehensive home exercise program and given resistance bands to continue with at home program as well. All questions were answered and concerns were addressed. Patient discharged this date.     Patient prognosis is Fair.     Patient will continue to benefit from skilled outpatient physical therapy to address the deficits listed in the problem list box on initial evaluation, provide pt/family education and to maximize pt's level of independence in the home and community environment.     Patient's spiritual, cultural and educational needs considered and pt agreeable to plan of care and goals.     Anticipated barriers to physical therapy: Age, Therapy consistency, lifestyle     Goals:   Short Term Goals: 4 weeks   1. Patient will reduce maximal pain rating to < 3/10 pain to facilitate ability to sleep through the night and recover from PT interventions.(Met)  2. Patient will demonstrate > 120 degrees  flexion to facilitate ability to perform overhead activities.  (Met)  3. Patient will demonstrate < 3/10 pain with self care tasks. (Met)     Long Term Goals: 8-10 weeks   1. Patient will demonstrate >/= 4/5 upper quarter and periscapular strength to facilitate pain free lifting tasks.(Ongoing, Not Met)   2. Patient will demonstrate > 60 degrees external rotation to facilitate grooming tasks without restrictions. (Met)  3. Patient will demonstrate > 60 degrees internal rotation to facilitate dressing tasks without restrictions. (Met)  4. Patient will demonstrate > 170 degrees flexion to facilitate ability to perform overhead activities.(Met)  5. Patient will be able to lift at least 5 lbs over head with < 3/10 pain to facilitate lifting tasks. (Met)    Plan     Plan of care Certification: 5/29/2024 to 7/31/2024.     Discharge this date with home exercise program.     Ciera Reynolds, PT, DPT

## 2024-07-31 ENCOUNTER — CLINICAL SUPPORT (OUTPATIENT)
Dept: REHABILITATION | Facility: HOSPITAL | Age: 74
End: 2024-07-31
Payer: OTHER GOVERNMENT

## 2024-07-31 DIAGNOSIS — R29.898 DECREASED STRENGTH OF LOWER EXTREMITY: ICD-10-CM

## 2024-07-31 DIAGNOSIS — Z74.09 IMPAIRED FUNCTIONAL MOBILITY, BALANCE, GAIT, AND ENDURANCE: ICD-10-CM

## 2024-07-31 PROCEDURE — 97112 NEUROMUSCULAR REEDUCATION: CPT | Mod: KX

## 2024-07-31 PROCEDURE — 97140 MANUAL THERAPY 1/> REGIONS: CPT | Mod: KX

## 2024-07-31 PROCEDURE — 97162 PT EVAL MOD COMPLEX 30 MIN: CPT | Mod: KX

## 2024-07-31 NOTE — PLAN OF CARE
[facility]   Physical Therapy Initial Evaluation      Name: Sunni No  Clinic Number: [id number]    Therapy Diagnosis:   Encounter Diagnoses   Name Primary?    Impaired functional mobility, balance, gait, and endurance     Decreased strength of lower extremity         Physician: Mary Lima MD    Physician Orders: PT Eval and Treat   Medical Diagnosis from Referral: M79.671 (ICD-10-CM) - Pain in right foot  Evaluation Date: 7/31/2024  Authorization Period Expiration: 7/6/2025  Plan of Care Expiration: 10/15/2024  Progress Note Due: 10th visit  Date of Surgery: NA  Visit # / Visits authorized: 1/ 1   FOTO: 1/ 3    Precautions: Standard     Time In: 12:30 PM  Time Out: 1:30 PM  Total Billable Time: 60 minutes    Subjective     Date of onset: 2-3 months    History of current condition - Sunni reports to the clinic with complaints of right foot pain that began about 2-3 months ago after she wore a pair of tennis shoe that she had not worn in a long time. She wore them throughout the day and by the end of the day (5 hours later) she started feeling pain in the arch of her foot. Since the initial pain, the pain has stayed about the same. Pain is better with rest, Biofreeze, and hot tub. Worse with standing and walking. Better in the morning and as the day goes, the pain worsens. She did get an injection but this only lasted about 24 hours so she denied getting another one. She is waiting for MRI to be scheduled.     Falls: None    Imaging: See imaging section    Prior Therapy: Yes  Social History: lives alone  Occupation: Retired  Prior Level of Function: independent with activities of daily living/instrumental activities of daily living   Current Level of Function: Difficulty with walking, standing    Pain:  Current 8/10, worst 10/10, best 5/10   Location: right feet    Description: Throbbing and Shooting  Aggravating Factors: Standing and Walking  Easing Factors: massage, hot  "bath, and rest    Patients goals: "get rid of this pain"     Medical History:   Past Medical History:   Diagnosis Date    Ulcer      Surgical History:   Sunni No  has no past surgical history on file.    Medications:   Sunni has a current medication list which includes the following prescription(s): dicyclomine and tramadol.    Allergies:   Review of patient's allergies indicates:  Not on File     Objective      Observation: Patient ambulates to the clinic independently with antalgic gait on right lower extremity with decreased step and stride length. Poor heel strike and foot flat during gait due to pain. Patient disperses weight on the outside of her foot to offload pain. Left foot demonstrates foot flat with medial arch drop during stance phase but no pain associated with this.    Posture: Forward head and rounded shoulders.     Active Range of Motion: * Denotes pain  Ankle Right - resting ~39 deg Left - resting ~33 deg   DF (knee extended) 0 2   Plantarflexion 49* 39   Inversion 29* 23   Eversion 21 17      Strength:  Ankle Right Left   Dorsiflexion 4/5 4/5   Plantarflexion 3+/5 4/5   Inversion 3+/5 4/5   Eversion 3+/5 4/5     Proximal Hip Strength: grossly 3+/5 bilaterally     Special Tests:  Anterior Drawer Neg   Talar tilt Neg   Squeeze test Neg   Lane Neg     Joint Mobility: within functional limits     Palpation: tenderness at navicular bone - possible accessory bone    Sensation: Normal    Functional Tests:   SLS EO: right - unable to perform; left - 3 seconds  TU seconds  30SSTS: 8x with upper extremity assistance and staggered weight shift  SLHRT: right - unable to perform; left - 10x  DLHRT: can perform but pain with initial heel raise on right      Intake Outcome Measure for FOTO Foot Survey    Therapist reviewed FOTO scores for Sunni No on 2024.   FOTO report - see Media section or FOTO account episode details.    Intake Score: see media section   "       Treatment     Total Treatment time (time-based codes) separate from Evaluation: 23 minutes     Sunni received the treatments listed below:      therapeutic exercises: to develop strength, endurance, ROM, flexibility, posture, and core stabilization for 00 minutes including:     manual therapy techniques: Joint mobilizations and Manual traction were applied to the: right ankle for 8 minutes, including:   Grade II-IV Talocrural mobilizations  Grade II-IV Subtalar mobilizations     neuromuscular re-education activities: to improve Balance, Coordination, Kinesthetic, Sense, Proprioception, Posture, and Motor Control for 15 minutes, including:   ABCs x 3 rounds bilateral  Towel scrunches 15 reps  Straight leg raise 1 x 10 bilateral     therapeutic activities: to improve functional performance for 00 minutes, including:     Patient Education and Home Exercises     Education provided:   - Activity modification  - Plan of care  - Home exercise program  - Functional Anatomy    Written Home Exercises Provided: Yes. Exercises were reviewed and Sunni was able to demonstrate them prior to the end of the session.  Sunni demonstrated good  understanding of the education provided. See EMR under Patient Instructions for exercises provided during therapy sessions.    Assessment     Sunni is a 74 y.o. female referred to outpatient Physical Therapy with a medical diagnosis of M79.671 (ICD-10-CM) - Pain in right foot. Patient presents with decreased ankle range of motion, decreased gross lower extremity strength, and poor talocrural and subtalar motor control. Patient is functionally limited with standing, walking, stairs, and transfer tasks. Based on assessment, pain is reproduced with palpation to navicular and with resisted ankle plantarflexion and inversion. Patient prognosis is Fair. Patient will benefit from skilled outpatient Physical Therapy to address the deficits stated above and in the chart below, provide  patient /family education, and to maximize patientt's level of independence.     Plan of care discussed with patient: Yes  Patient's spiritual, cultural and educational needs considered and patient is agreeable to the plan of care and goals as stated below:     Anticipated Barriers for therapy: Therapy consistency, chronic, PMH, lifestyle    Medical Necessity is demonstrated by the following  History  Co-morbidities and personal factors that may impact the plan of care [] LOW: no personal factors / co-morbidities  [] MODERATE: 1-2 personal factors / co-morbidities  [x] HIGH: 3+ personal factors / co-morbidities    Moderate / High Support Documentation:   Co-morbidities affecting plan of care: see PMH    Personal Factors:   age  lifestyle     Examination  Body Structures and Functions, activity limitations and participation restrictions that may impact the plan of care [] LOW: addressing 1-2 elements  [] MODERATE: 3+ elements  [x] HIGH: 4+ elements (please support below)    Moderate / High Support Documentation: range of motion, strength, motor control, mobility     Clinical Presentation [] LOW: stable  [x] MODERATE: Evolving  [] HIGH: Unstable     Decision Making/ Complexity Score: moderate       Goals:  Short Term Goals: 4 weeks   1. Patient will reduce maximal pain rating to < 3/10 pain to facilitate ability to sleep through the night and recover from PT interventions.  2. Patient will be able to stand/ambulate at least 10 minutes with < 3/10 pain to improve standing/walking tolerance.   3. Patient will improve right ankle dorsiflexion range of motion to at least 5 degrees for improvement in gait mechanics.      Long Term Goals: 8-10 weeks   1. Patient will demonstrate > 4/5 lower quarter strength to facilitate transfers from sit to stand from various surfaces without restriction.    2. Patient will perform TUG score </= 10 seconds for improvement with walking tolerance.   3. Patient will improve single limb  balance for at least 10 seconds to improve single limb loading for functional activities.    4. Patient will be able to stand/ambulate at least 30 minutes with < 3/10 pain to improve standing/walking tolerance.     Plan     Plan of care Certification: 7/31/2024 to 10/15/2024.    Outpatient Physical Therapy 2 times weekly for 10 weeks to include the following interventions: Gait Training, Manual Therapy, Moist Heat/ Ice, Neuromuscular Re-ed, Patient Education, Self Care, Therapeutic Activities, and Therapeutic Exercise.     Ciera Reynolds, PT, DPT        Physician's Signature: _________________________________________ Date: ________________

## 2024-08-06 ENCOUNTER — CLINICAL SUPPORT (OUTPATIENT)
Dept: REHABILITATION | Facility: HOSPITAL | Age: 74
End: 2024-08-06
Payer: OTHER GOVERNMENT

## 2024-08-06 DIAGNOSIS — Z74.09 IMPAIRED FUNCTIONAL MOBILITY, BALANCE, GAIT, AND ENDURANCE: Primary | ICD-10-CM

## 2024-08-06 DIAGNOSIS — R29.898 DECREASED STRENGTH OF LOWER EXTREMITY: ICD-10-CM

## 2024-08-06 PROCEDURE — 97112 NEUROMUSCULAR REEDUCATION: CPT | Mod: CQ

## 2024-08-08 ENCOUNTER — CLINICAL SUPPORT (OUTPATIENT)
Dept: REHABILITATION | Facility: HOSPITAL | Age: 74
End: 2024-08-08
Payer: OTHER GOVERNMENT

## 2024-08-08 DIAGNOSIS — Z74.09 IMPAIRED FUNCTIONAL MOBILITY, BALANCE, GAIT, AND ENDURANCE: Primary | ICD-10-CM

## 2024-08-08 DIAGNOSIS — R29.898 DECREASED STRENGTH OF LOWER EXTREMITY: ICD-10-CM

## 2024-08-08 PROCEDURE — 97112 NEUROMUSCULAR REEDUCATION: CPT | Mod: CQ

## 2024-08-13 ENCOUNTER — CLINICAL SUPPORT (OUTPATIENT)
Dept: REHABILITATION | Facility: HOSPITAL | Age: 74
End: 2024-08-13
Payer: OTHER GOVERNMENT

## 2024-08-13 DIAGNOSIS — Z74.09 IMPAIRED FUNCTIONAL MOBILITY, BALANCE, GAIT, AND ENDURANCE: Primary | ICD-10-CM

## 2024-08-13 DIAGNOSIS — R29.898 DECREASED STRENGTH OF LOWER EXTREMITY: ICD-10-CM

## 2024-08-13 PROCEDURE — 97530 THERAPEUTIC ACTIVITIES: CPT | Mod: KX

## 2024-08-13 PROCEDURE — 97112 NEUROMUSCULAR REEDUCATION: CPT | Mod: KX

## 2024-08-13 NOTE — PROGRESS NOTES
[facility]   Physical Therapy Treatment Note      [id number]    Therapy Diagnosis:   Encounter Diagnoses   Name Primary?    Impaired functional mobility, balance, gait, and endurance Yes    Decreased strength of lower extremity        Physician: Order, Paper    Visit Date: 8/13/2024    Physician Orders: PT Eval and Treat   Medical Diagnosis from Referral: M79.671 (ICD-10-CM) - Pain in right foot  Evaluation Date: 7/31/2024  Authorization Period Expiration: 7/6/2025  Plan of Care Expiration: 10/15/2024  Progress Note Due: 10th visit  Date of Surgery: NA  Visit # / Visits authorized: 11/20  PTA Visit #: 1/5     FOTO: 1/ 3     Time In: 1:00 PM  Time Out: 1:55 PM  Total Billable Time: 53 minutes    Precautions: Standard       Subjective     Patient reports: that the tenderness is much less and able to walk a little bit better now.   She was compliant with home exercise program.  Response to previous treatment: no adverse effects  Functional change: ongoing    Pain: 3/10  Location: right foot      Objective      Objective Measures updated at progress report unless specified.     Treatment     Sunni received the treatments listed below:      therapeutic exercises to develop strength, endurance, ROM, flexibility, posture, and core stabilization for 0 minutes including:    manual therapy techniques: Joint mobilizations and Manual traction were applied to the: right ankle for 0 minutes, including:     neuromuscular re-education activities: to improve Balance, Coordination, Kinesthetic, Sense, Proprioception, Posture, and Motor Control for 43 minutes, including:   ABCs x 2 rounds bilateral  Towel scrunches 2x15 reps  Toe arches x30 with 5 sec holds  Seated fwd/bkw shifting on 2 point fitter: x30 with 5 sec hold  Sated cw/cww on 1 point fitter: 15/direction  Standing heel raises with upper extremity support 2x15  Standing toe raises with upper extremity  support 2x15  Plantar fascia stretch in long sitting with towel  Sidelying clamshells BTB 5 sec hold 3 x 10 bilateral     therapeutic activities to improve functional performance for 10 minutes, including:  NuStep 1000K steps/10' for gait mechanics    gait training to improve functional mobility and safety for 0 minutes, including:      Patient Education and Home Exercises       Education provided:   - Patient was educated on HEP and on all therapeutic interventions performed during today's treatment visit.     Written Home Exercises Provided: Pt instructed to continue prior HEP. Exercises were reviewed and Sunni was able to demonstrate them prior to the end of the session.  Sunni demonstrated good  understanding of the education provided. See Electronic Medical Record under Patient Instructions for exercises provided during therapy sessions    Assessment     Interventions continue to focus on mobility through the talocrural and subtalar joint along with stabilization of the transverse and longitudinal arch to assist with gait mechanics and walking/standing tolerance. Able to progress with frequency this date and added proximal hip strengthening this date without adverse reactions. Will continue to progress as tolerated.     Patient prognosis is Fair.     Patient will continue to benefit from skilled outpatient physical therapy to address the deficits listed in the problem list box on initial evaluation, provide pt/family education and to maximize pt's level of independence in the home and community environment.     Patient's spiritual, cultural and educational needs considered and pt agreeable to plan of care and goals.     Anticipated barriers to physical therapy: Therapy consistency, chronic, PMH, lifestyle     Goals:   Short Term Goals: 4 weeks (Ongoing, Not Met)  1. Patient will reduce maximal pain rating to < 3/10 pain to facilitate ability to sleep through the night and recover from PT interventions.  2.  Patient will be able to stand/ambulate at least 10 minutes with < 3/10 pain to improve standing/walking tolerance.   3. Patient will improve right ankle dorsiflexion range of motion to at least 5 degrees for improvement in gait mechanics.      Long Term Goals: 8-10 weeks (Ongoing, Not Met)  1. Patient will demonstrate > 4/5 lower quarter strength to facilitate transfers from sit to stand from various surfaces without restriction.    2. Patient will perform TUG score </= 10 seconds for improvement with walking tolerance.   3. Patient will improve single limb balance for at least 10 seconds to improve single limb loading for functional activities.    4. Patient will be able to stand/ambulate at least 30 minutes with < 3/10 pain to improve standing/walking tolerance.     Plan     Plan of care Certification: 7/31/2024 to 10/15/2024.     Outpatient Physical Therapy 2 times weekly for 10 weeks to include the following interventions: Gait Training, Manual Therapy, Moist Heat/ Ice, Neuromuscular Re-ed, Patient Education, Self Care, Therapeutic Activities, and Therapeutic Exercise.     Ciera Reynolds, PT, DPT

## 2024-08-20 ENCOUNTER — CLINICAL SUPPORT (OUTPATIENT)
Dept: REHABILITATION | Facility: HOSPITAL | Age: 74
End: 2024-08-20
Payer: OTHER GOVERNMENT

## 2024-08-20 DIAGNOSIS — Z74.09 IMPAIRED FUNCTIONAL MOBILITY, BALANCE, GAIT, AND ENDURANCE: Primary | ICD-10-CM

## 2024-08-20 DIAGNOSIS — R29.898 DECREASED STRENGTH OF LOWER EXTREMITY: ICD-10-CM

## 2024-08-20 PROCEDURE — 97530 THERAPEUTIC ACTIVITIES: CPT | Mod: CQ

## 2024-08-20 PROCEDURE — 97112 NEUROMUSCULAR REEDUCATION: CPT | Mod: CQ

## 2024-08-20 NOTE — PROGRESS NOTES
[facility]   Physical Therapy Treatment Note      [id number]    Therapy Diagnosis:   Encounter Diagnoses   Name Primary?    Impaired functional mobility, balance, gait, and endurance Yes    Decreased strength of lower extremity        Physician: Order, Paper    Visit Date: 8/20/2024    Physician Orders: PT Eval and Treat   Medical Diagnosis from Referral: M79.671 (ICD-10-CM) - Pain in right foot  Evaluation Date: 7/31/2024  Authorization Period Expiration: 7/6/2025  Plan of Care Expiration: 10/15/2024  Progress Note Due: 10th visit  Date of Surgery: NA  Visit # / Visits authorized: 12/20  PTA Visit #: 1/5     FOTO: 1/ 3     Time In: 1:00 PM  Time Out: 1:55 PM  Total Billable Time: 55 minutes    Precautions: Standard       Subjective     Patient reports: decreaed tenderness and able to walk a little bit better now.   She was compliant with home exercise program.  Response to previous treatment: no adverse effects  Functional change: ongoing    Pain: 3/10  Location: right foot      Objective      Objective Measures updated at progress report unless specified.     Treatment     Sunni received the treatments listed below:      therapeutic exercises to develop strength, endurance, ROM, flexibility, posture, and core stabilization for 0 minutes including:    manual therapy techniques: Joint mobilizations and Manual traction were applied to the: right ankle for 0 minutes, including:     neuromuscular re-education activities: to improve Balance, Coordination, Kinesthetic, Sense, Proprioception, Posture, and Motor Control for 38 minutes, including:   Towel scrunches 2x15 reps  Toe arches x15 with 5 sec holds  Seated fwd/bkw shifting on 2 point fitter: x30 with 5 sec hold  Seated cw/cww on 1 point fitter: 15/direction  Standing heel raises with upper extremity support 2x15  Standing toe raises with upper extremity support 2x15  Plantar fascia stretch in  long sitting with towel  Sidelying clamshells BTB 5 sec hold 3 x 10 bilateral     therapeutic activities to improve functional performance for 17 minutes, including:  NuStep 1000K steps/17' for gait mechanics    gait training to improve functional mobility and safety for 0 minutes, including:      Patient Education and Home Exercises       Education provided:   - Patient was educated on HEP and on all therapeutic interventions performed during today's treatment visit.     Written Home Exercises Provided: Pt instructed to continue prior HEP. Exercises were reviewed and Sunni was able to demonstrate them prior to the end of the session.  Sunni demonstrated good  understanding of the education provided. See Electronic Medical Record under Patient Instructions for exercises provided during therapy sessions    Assessment     Interventions continue to focus on mobility through the talocrural and subtalar joint along with stabilization of the transverse and longitudinal arch to assist with gait mechanics and walking/standing tolerance. Continued proximal hip strengthening this date without adverse reactions. Will continue to progress as tolerated.     Patient prognosis is Fair.     Patient will continue to benefit from skilled outpatient physical therapy to address the deficits listed in the problem list box on initial evaluation, provide pt/family education and to maximize pt's level of independence in the home and community environment.     Patient's spiritual, cultural and educational needs considered and pt agreeable to plan of care and goals.     Anticipated barriers to physical therapy: Therapy consistency, chronic, PMH, lifestyle     Goals:   Short Term Goals: 4 weeks (Ongoing, Not Met)  1. Patient will reduce maximal pain rating to < 3/10 pain to facilitate ability to sleep through the night and recover from PT interventions.  2. Patient will be able to stand/ambulate at least 10 minutes with < 3/10 pain to  improve standing/walking tolerance.   3. Patient will improve right ankle dorsiflexion range of motion to at least 5 degrees for improvement in gait mechanics.      Long Term Goals: 8-10 weeks (Ongoing, Not Met)  1. Patient will demonstrate > 4/5 lower quarter strength to facilitate transfers from sit to stand from various surfaces without restriction.    2. Patient will perform TUG score </= 10 seconds for improvement with walking tolerance.   3. Patient will improve single limb balance for at least 10 seconds to improve single limb loading for functional activities.    4. Patient will be able to stand/ambulate at least 30 minutes with < 3/10 pain to improve standing/walking tolerance.     Plan     Plan of care Certification: 7/31/2024 to 10/15/2024.     Outpatient Physical Therapy 2 times weekly for 10 weeks to include the following interventions: Gait Training, Manual Therapy, Moist Heat/ Ice, Neuromuscular Re-ed, Patient Education, Self Care, Therapeutic Activities, and Therapeutic Exercise.     Gab Healy, PTA

## 2024-09-09 ENCOUNTER — CLINICAL SUPPORT (OUTPATIENT)
Dept: REHABILITATION | Facility: HOSPITAL | Age: 74
End: 2024-09-09
Payer: OTHER GOVERNMENT

## 2024-09-09 DIAGNOSIS — Z74.09 IMPAIRED FUNCTIONAL MOBILITY, BALANCE, GAIT, AND ENDURANCE: Primary | ICD-10-CM

## 2024-09-09 DIAGNOSIS — R29.898 DECREASED STRENGTH OF LOWER EXTREMITY: ICD-10-CM

## 2024-09-09 PROCEDURE — 97112 NEUROMUSCULAR REEDUCATION: CPT | Mod: CQ

## 2024-09-09 NOTE — PROGRESS NOTES
[facility]   Physical Therapy Treatment Note      [id number]    Therapy Diagnosis:   Encounter Diagnoses   Name Primary?    Impaired functional mobility, balance, gait, and endurance Yes    Decreased strength of lower extremity      Physician: Order, Paper    Visit Date: 9/9/2024    Physician Orders: PT Eval and Treat   Medical Diagnosis from Referral: M79.671 (ICD-10-CM) - Pain in right foot  Evaluation Date: 7/31/2024  Authorization Period Expiration: 7/6/2025  Plan of Care Expiration: 10/15/2024  Progress Note Due: 10th visit  Date of Surgery: NA  Visit # / Visits authorized: 13/20  PTA Visit #: 2/5     FOTO: 1/ 3     Time In: 12:35 PM  Time Out: 13:26 PM  Total Billable Time: 51 minutes    Precautions: Standard       Subjective     Patient reports: that she is getting more mobility in her right foot. She does not have to walk on the side of her foot anymore.   She was compliant with home exercise program.  Response to previous treatment: no adverse effects  Functional change: ongoing    Pain: 3/10  Location: right foot      Objective      Objective Measures updated at progress report unless specified.     Treatment     Sunni received the treatments listed below:      therapeutic exercises to develop strength, endurance, ROM, flexibility, posture, and core stabilization for 0 minutes including:    manual therapy techniques: Joint mobilizations and Manual traction were applied to the: right ankle for 0 minutes, including:     neuromuscular re-education activities: to improve Balance, Coordination, Kinesthetic, Sense, Proprioception, Posture, and Motor Control for 51 minutes, including:   Towel scrunches 2x15 reps  Toe arches x15 with 5 sec holds  Seated fwd/bkw shifting on 2 point fitter: x30 with 5 sec hold  Seated cw/cww on 1 point fitter: 15/direction  Standing heel raises with upper extremity support 2x15  Gastroc stretch on fitter board  L2: 3x1 min  Standing toe raises with upper extremity support 2x15  Plantar fascia stretch in long sitting with towel  Inversion + plantarflexion with yellow tb: 10x10 sec holds  Sidelying clamshells BTB 5 sec hold 3 x 10 bilateral np    therapeutic activities to improve functional performance for 0 minutes, including:  NuStep 1000K steps/17' for gait mechanics np    gait training to improve functional mobility and safety for 0 minutes, including:      Patient Education and Home Exercises       Education provided:   - Patient was educated on HEP and on all therapeutic interventions performed during today's treatment visit.     Written Home Exercises Provided: Pt instructed to continue prior HEP. Exercises were reviewed and Sunni was able to demonstrate them prior to the end of the session.  Sunni demonstrated good  understanding of the education provided. See Electronic Medical Record under Patient Instructions for exercises provided during therapy sessions    Assessment     Patient reports much improved function with decreased pain. Patient, however, does exhibit pain/discomfort upon palpating posterior tibialis tendon. Hence, initiated posterior tibialis strengthening during today's treatment visit. Interventions also continued to focus on mobility through the talocrural and subtalar joint along with stabilization of the transverse and longitudinal arch to assist with gait mechanics and walking/standing tolerance. Will continue to progress as tolerated.     Patient prognosis is Fair.     Patient will continue to benefit from skilled outpatient physical therapy to address the deficits listed in the problem list box on initial evaluation, provide pt/family education and to maximize pt's level of independence in the home and community environment.     Patient's spiritual, cultural and educational needs considered and pt agreeable to plan of care and goals.     Anticipated barriers to physical therapy: Therapy  consistency, chronic, PMH, lifestyle     Goals:   Short Term Goals: 4 weeks (Ongoing, Not Met)  1. Patient will reduce maximal pain rating to < 3/10 pain to facilitate ability to sleep through the night and recover from PT interventions.  2. Patient will be able to stand/ambulate at least 10 minutes with < 3/10 pain to improve standing/walking tolerance.   3. Patient will improve right ankle dorsiflexion range of motion to at least 5 degrees for improvement in gait mechanics.      Long Term Goals: 8-10 weeks (Ongoing, Not Met)  1. Patient will demonstrate > 4/5 lower quarter strength to facilitate transfers from sit to stand from various surfaces without restriction.    2. Patient will perform TUG score </= 10 seconds for improvement with walking tolerance.   3. Patient will improve single limb balance for at least 10 seconds to improve single limb loading for functional activities.    4. Patient will be able to stand/ambulate at least 30 minutes with < 3/10 pain to improve standing/walking tolerance.     Plan     Plan of care Certification: 7/31/2024 to 10/15/2024.     Outpatient Physical Therapy 2 times weekly for 10 weeks to include the following interventions: Gait Training, Manual Therapy, Moist Heat/ Ice, Neuromuscular Re-ed, Patient Education, Self Care, Therapeutic Activities, and Therapeutic Exercise.     Gab Healy, PTA

## 2024-09-13 ENCOUNTER — CLINICAL SUPPORT (OUTPATIENT)
Dept: REHABILITATION | Facility: HOSPITAL | Age: 74
End: 2024-09-13
Payer: OTHER GOVERNMENT

## 2024-09-13 DIAGNOSIS — Z74.09 IMPAIRED FUNCTIONAL MOBILITY, BALANCE, GAIT, AND ENDURANCE: Primary | ICD-10-CM

## 2024-09-13 DIAGNOSIS — R29.898 DECREASED STRENGTH OF LOWER EXTREMITY: ICD-10-CM

## 2024-09-13 PROCEDURE — 97112 NEUROMUSCULAR REEDUCATION: CPT | Mod: KX

## 2024-09-13 PROCEDURE — 97530 THERAPEUTIC ACTIVITIES: CPT | Mod: KX
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
## 2024-09-16 ENCOUNTER — CLINICAL SUPPORT (OUTPATIENT)
Dept: REHABILITATION | Facility: HOSPITAL | Age: 74
End: 2024-09-16
Payer: OTHER GOVERNMENT

## 2024-09-16 DIAGNOSIS — R29.898 DECREASED STRENGTH OF LOWER EXTREMITY: ICD-10-CM

## 2024-09-16 DIAGNOSIS — Z74.09 IMPAIRED FUNCTIONAL MOBILITY, BALANCE, GAIT, AND ENDURANCE: Primary | ICD-10-CM

## 2024-09-16 PROCEDURE — 97530 THERAPEUTIC ACTIVITIES: CPT | Mod: KX

## 2024-09-16 PROCEDURE — 97112 NEUROMUSCULAR REEDUCATION: CPT | Mod: KX

## 2024-09-16 NOTE — PROGRESS NOTES
[facility]   Physical Therapy Treatment Note      [id number]    Therapy Diagnosis:   Encounter Diagnoses   Name Primary?    Impaired functional mobility, balance, gait, and endurance Yes    Decreased strength of lower extremity      Physician: Order, Paper    Visit Date: 9/16/2024    Physician Orders: PT Eval and Treat   Medical Diagnosis from Referral: M79.671 (ICD-10-CM) - Pain in right foot  Evaluation Date: 7/31/2024  Authorization Period Expiration: 7/6/2025  Plan of Care Expiration: 10/15/2024  Progress Note Due: 10th visit  Date of Surgery: NA  Visit # / Visits authorized: 15/20  PTA Visit #: 2/5     FOTO: 2/ 3     Time In: 11:45 AM  Time Out: 12:40 AM  Total Billable Time: 40 minutes    Precautions: Standard       Subjective     Patient reports: that she was sore after the last appointment with the new exercises.   She was compliant with home exercise program.  Response to previous treatment: no adverse effects  Functional change: ongoing    Pain: 1-3/10  Location: right foot      Objective      Objective Measures updated at progress report unless specified.     Treatment     Sunni received the treatments listed below:      therapeutic exercises to develop strength, endurance, ROM, flexibility, posture, and core stabilization for 0 minutes including:    manual therapy techniques: Joint mobilizations and Manual traction were applied to the: right ankle for 0 minutes, including:     neuromuscular re-education activities: to improve Balance, Coordination, Kinesthetic, Sense, Proprioception, Posture, and Motor Control for 32 minutes, including:   Towel scrunches 2x15 reps  Toe arches x15 with 5 sec holds  Standing heel raises with upper extremity support 2x15  Gastroc stretch on fitter board L2: 3x1 min  Seated calf raises on 1/2 foam 10# dumbbell 3 x 15 each  Inversion + plantarflexion with yellow tb: 10x10 sec holds  Standing hip abduction  "+ 1 airex pad 3 x 10     therapeutic activities to improve functional performance for 23 minutes, including:  NuStep 1000K steps/10' for gait mechanics   Lateral band walks GTB 3 laps  Standing calf raises on 4" step 3 x 10    gait training to improve functional mobility and safety for 0 minutes, including:      Patient Education and Home Exercises       Education provided:   - Patient was educated on HEP and on all therapeutic interventions performed during today's treatment visit.     Written Home Exercises Provided: Pt instructed to continue prior HEP. Exercises were reviewed and Sunni was able to demonstrate them prior to the end of the session.  Sunni demonstrated good  understanding of the education provided. See Electronic Medical Record under Patient Instructions for exercises provided during therapy sessions    Assessment     Continued with exercise progressions from last session due to subjective reports of increased muscle soreness. She was able to perform and tolerate all exercises without any adverse reactions and significantly reduced pain, limitations, challenge as compared to last session. Will plan to perform reassessment in upcoming sessions. Will continue to progress as tolerated.     Patient prognosis is Fair.     Patient will continue to benefit from skilled outpatient physical therapy to address the deficits listed in the problem list box on initial evaluation, provide pt/family education and to maximize pt's level of independence in the home and community environment.     Patient's spiritual, cultural and educational needs considered and pt agreeable to plan of care and goals.     Anticipated barriers to physical therapy: Therapy consistency, chronic, PMH, lifestyle     Goals:   Short Term Goals: 4 weeks (Ongoing, Not Met)  1. Patient will reduce maximal pain rating to < 3/10 pain to facilitate ability to sleep through the night and recover from PT interventions.  2. Patient will be able " to stand/ambulate at least 10 minutes with < 3/10 pain to improve standing/walking tolerance.   3. Patient will improve right ankle dorsiflexion range of motion to at least 5 degrees for improvement in gait mechanics.      Long Term Goals: 8-10 weeks (Ongoing, Not Met)  1. Patient will demonstrate > 4/5 lower quarter strength to facilitate transfers from sit to stand from various surfaces without restriction.    2. Patient will perform TUG score </= 10 seconds for improvement with walking tolerance.   3. Patient will improve single limb balance for at least 10 seconds to improve single limb loading for functional activities.    4. Patient will be able to stand/ambulate at least 30 minutes with < 3/10 pain to improve standing/walking tolerance.     Plan     Plan of care Certification: 7/31/2024 to 10/15/2024.     Outpatient Physical Therapy 2 times weekly for 10 weeks to include the following interventions: Gait Training, Manual Therapy, Moist Heat/ Ice, Neuromuscular Re-ed, Patient Education, Self Care, Therapeutic Activities, and Therapeutic Exercise.     Ciera Reynolds, PT, DPT

## 2024-09-23 ENCOUNTER — CLINICAL SUPPORT (OUTPATIENT)
Dept: REHABILITATION | Facility: HOSPITAL | Age: 74
End: 2024-09-23
Payer: OTHER GOVERNMENT

## 2024-09-23 DIAGNOSIS — Z74.09 IMPAIRED FUNCTIONAL MOBILITY, BALANCE, GAIT, AND ENDURANCE: Primary | ICD-10-CM

## 2024-09-23 DIAGNOSIS — R29.898 DECREASED STRENGTH OF LOWER EXTREMITY: ICD-10-CM

## 2024-09-23 PROCEDURE — 97530 THERAPEUTIC ACTIVITIES: CPT | Mod: KX

## 2024-09-23 PROCEDURE — 97112 NEUROMUSCULAR REEDUCATION: CPT | Mod: KX
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
## 2024-09-30 ENCOUNTER — CLINICAL SUPPORT (OUTPATIENT)
Dept: REHABILITATION | Facility: HOSPITAL | Age: 74
End: 2024-09-30
Payer: MEDICARE

## 2024-09-30 DIAGNOSIS — R29.898 DECREASED STRENGTH OF LOWER EXTREMITY: ICD-10-CM

## 2024-09-30 DIAGNOSIS — Z74.09 IMPAIRED FUNCTIONAL MOBILITY, BALANCE, GAIT, AND ENDURANCE: Primary | ICD-10-CM

## 2024-09-30 PROCEDURE — 97530 THERAPEUTIC ACTIVITIES: CPT | Mod: KX

## 2024-09-30 PROCEDURE — 97112 NEUROMUSCULAR REEDUCATION: CPT | Mod: KX

## 2024-09-30 NOTE — PROGRESS NOTES
[facility]   Physical Therapy Treatment Note      [id number]    Therapy Diagnosis:   Encounter Diagnoses   Name Primary?    Impaired functional mobility, balance, gait, and endurance Yes    Decreased strength of lower extremity      Physician: Order, Paper    Visit Date: 9/30/2024    Physician Orders: PT Eval and Treat   Medical Diagnosis from Referral: M79.671 (ICD-10-CM) - Pain in right foot  Evaluation Date: 7/31/2024  Authorization Period Expiration: 7/6/2025  Plan of Care Expiration: 10/15/2024  Progress Note Due: 10th visit  Date of Surgery: NA  Visit # / Visits authorized: 17/20  PTA Visit #: 2/5     FOTO: 2/ 3     Time In: 12:00 PM  Time Out: 12:55 PM  Total Billable Time: 30 minutes    Precautions: Standard       Subjective     Patient reports: that she is feeling pretty good today.   She was compliant with home exercise program.  Response to previous treatment: no adverse effects  Functional change: ongoing    Pain: 1-3/10  Location: right foot      Objective      Objective Measures updated at progress report unless specified.     Treatment     Sunni received the treatments listed below:      therapeutic exercises to develop strength, endurance, ROM, flexibility, posture, and core stabilization for 0 minutes including:    manual therapy techniques: Joint mobilizations and Manual traction were applied to the: right ankle for 0 minutes, including:     neuromuscular re-education activities: to improve Balance, Coordination, Kinesthetic, Sense, Proprioception, Posture, and Motor Control for 32 minutes, including:   Toe arches x15 with 5 sec holds  Standing heel raises with upper extremity support 2x15  Gastroc stretch on fitter board L2: 3x1 min  Seated calf raises on 1/2 foam 10# dumbbell 3 x 15 each  Seated calf raises + tennis ball post tib 3 x 10   Inversion + plantarflexion with yellow tb: 10x10 sec holds  Standing hip abduction + 1  "airex pad 3 x 10     therapeutic activities to improve functional performance for 23 minutes, including:  NuStep 1000K steps/10' for gait mechanics   Lateral band walks GTB 3 laps  Standing calf raises on 4" step 3 x 10  Farmer carry 12# KB bilateral 3' x 2 rounds    gait training to improve functional mobility and safety for 0 minutes, including:      Patient Education and Home Exercises       Education provided:   - Patient was educated on HEP and on all therapeutic interventions performed during today's treatment visit.     Written Home Exercises Provided: Pt instructed to continue prior HEP. Exercises were reviewed and Sunni was able to demonstrate them prior to the end of the session.  Sunni demonstrated good  understanding of the education provided. See Electronic Medical Record under Patient Instructions for exercises provided during therapy sessions    Assessment     Continued with exercise interventions without any adverse reactions. Patient is appropriate for progressions next visit with load, intensity, and frequency. Will plan to perform reassessment in upcoming sessions. Will continue to progress as tolerated.     Patient prognosis is Fair.     Patient will continue to benefit from skilled outpatient physical therapy to address the deficits listed in the problem list box on initial evaluation, provide pt/family education and to maximize pt's level of independence in the home and community environment.     Patient's spiritual, cultural and educational needs considered and pt agreeable to plan of care and goals.     Anticipated barriers to physical therapy: Therapy consistency, chronic, PMH, lifestyle     Goals:   Short Term Goals: 4 weeks (Ongoing, Not Met)  1. Patient will reduce maximal pain rating to < 3/10 pain to facilitate ability to sleep through the night and recover from PT interventions.  2. Patient will be able to stand/ambulate at least 10 minutes with < 3/10 pain to improve " standing/walking tolerance.   3. Patient will improve right ankle dorsiflexion range of motion to at least 5 degrees for improvement in gait mechanics.      Long Term Goals: 8-10 weeks (Ongoing, Not Met)  1. Patient will demonstrate > 4/5 lower quarter strength to facilitate transfers from sit to stand from various surfaces without restriction.    2. Patient will perform TUG score </= 10 seconds for improvement with walking tolerance.   3. Patient will improve single limb balance for at least 10 seconds to improve single limb loading for functional activities.    4. Patient will be able to stand/ambulate at least 30 minutes with < 3/10 pain to improve standing/walking tolerance.     Plan     Plan of care Certification: 7/31/2024 to 10/15/2024.     Outpatient Physical Therapy 2 times weekly for 10 weeks to include the following interventions: Gait Training, Manual Therapy, Moist Heat/ Ice, Neuromuscular Re-ed, Patient Education, Self Care, Therapeutic Activities, and Therapeutic Exercise.     Ciera Reynolds, PT, DPT

## 2024-10-16 ENCOUNTER — CLINICAL SUPPORT (OUTPATIENT)
Dept: REHABILITATION | Facility: HOSPITAL | Age: 74
End: 2024-10-16
Payer: OTHER GOVERNMENT

## 2024-10-16 DIAGNOSIS — Z74.09 IMPAIRED FUNCTIONAL MOBILITY, BALANCE, GAIT, AND ENDURANCE: Primary | ICD-10-CM

## 2024-10-16 DIAGNOSIS — R29.898 DECREASED STRENGTH OF LOWER EXTREMITY: ICD-10-CM

## 2024-10-16 PROCEDURE — 97530 THERAPEUTIC ACTIVITIES: CPT | Mod: KX

## 2024-10-16 PROCEDURE — 97112 NEUROMUSCULAR REEDUCATION: CPT | Mod: KX

## 2024-10-21 ENCOUNTER — CLINICAL SUPPORT (OUTPATIENT)
Dept: REHABILITATION | Facility: HOSPITAL | Age: 74
End: 2024-10-21
Payer: OTHER GOVERNMENT

## 2024-10-21 DIAGNOSIS — R29.898 DECREASED STRENGTH OF LOWER EXTREMITY: ICD-10-CM

## 2024-10-21 DIAGNOSIS — Z74.09 IMPAIRED FUNCTIONAL MOBILITY, BALANCE, GAIT, AND ENDURANCE: Primary | ICD-10-CM

## 2024-10-21 PROCEDURE — 97530 THERAPEUTIC ACTIVITIES: CPT | Mod: KX

## 2024-10-25 NOTE — PROGRESS NOTES
[facility]   Physical Therapy Treatment Note/ Reassessment/ Updated POC     Name: Sunni No  Clinic Number: [id number]    Therapy Diagnosis:   Encounter Diagnoses   Name Primary?    Impaired functional mobility, balance, gait, and endurance Yes    Decreased strength of lower extremity      Physician: Mary Lima MD    Visit Date: 10/16/2024    Physician Orders: PT Eval and Treat   Medical Diagnosis from Referral: M79.671 (ICD-10-CM) - Pain in right foot  Evaluation Date: 7/31/2024  Authorization Period Expiration: 7/6/2025  Plan of Care Expiration: 10/15/2024 to 10/25/2024  Progress Note Due: 10th visit  Date of Surgery: NA  Visit # / Visits authorized: 18/20  PTA Visit #: 2/5     FOTO: 3/ 3     Time In: 2:30 PM  Time Out: 3:30 PM  Total Billable Time: 60 minutes    Precautions: Standard       Subjective     Patient reports: no new complaints.   She was compliant with home exercise program.  Response to previous treatment: no adverse effects  Functional change: ongoing    Pain: 1-3/10  Location: right foot      Objective      Objective Measures updated at progress report unless specified.     Treatment     Sunni received the treatments listed below:      therapeutic exercises to develop strength, endurance, ROM, flexibility, posture, and core stabilization for 0 minutes including:    manual therapy techniques: Joint mobilizations and Manual traction were applied to the: right ankle for 0 minutes, including:     neuromuscular re-education activities: to improve Balance, Coordination, Kinesthetic, Sense, Proprioception, Posture, and Motor Control for 32 minutes, including:   Toe arches x15 with 5 sec holds  Standing heel raises with upper extremity support 2x15  Gastroc stretch on fitter board L2: 3x1 min  Seated calf raises on 1/2 foam 10# dumbbell 3 x 15 each  Seated calf raises + tennis ball post tib 3 x 10   Inversion + plantarflexion with yellow tb: 10x10 sec  "holds  Standing hip abduction + 1 airex pad 3 x 10     therapeutic activities to improve functional performance for 23 minutes, including:  NuStep 1000K steps/10' for gait mechanics   Lateral band walks GTB 3 laps  Standing calf raises on 4" step 3 x 10  Farmer carry 12# KB bilateral 3' x 2 rounds    gait training to improve functional mobility and safety for 0 minutes, including:      Patient Education and Home Exercises       Education provided:   - Patient was educated on HEP and on all therapeutic interventions performed during today's treatment visit.     Written Home Exercises Provided: Pt instructed to continue prior HEP. Exercises were reviewed and Sunni was able to demonstrate them prior to the end of the session.  Sunni demonstrated good  understanding of the education provided. See Electronic Medical Record under Patient Instructions for exercises provided during therapy sessions    Assessment     Reassessment performed this date and patient has met all therapy goals at this time. Improvements noted with range of motion, strength, single leg balance, heel raise test, and ambulation status. Patient and therapist have discussed discharge at this time due to significant improvement in function and no longer with foot/heel pain. Will provide patient with discharge home exercise program next visit.     Patient prognosis is Fair.     Patient will continue to benefit from skilled outpatient physical therapy to address the deficits listed in the problem list box on initial evaluation, provide pt/family education and to maximize pt's level of independence in the home and community environment.     Patient's spiritual, cultural and educational needs considered and pt agreeable to plan of care and goals.     Anticipated barriers to physical therapy: Therapy consistency, chronic, PMH, lifestyle     Goals:   Short Term Goals: 4 weeks   1. Patient will reduce maximal pain rating to < 3/10 pain to facilitate ability " to sleep through the night and recover from PT interventions.(Met)  2. Patient will be able to stand/ambulate at least 10 minutes with < 3/10 pain to improve standing/walking tolerance. (Met)  3. Patient will improve right ankle dorsiflexion range of motion to at least 5 degrees for improvement in gait mechanics. (Met)     Long Term Goals: 8-10 weeks   1. Patient will demonstrate > 4/5 lower quarter strength to facilitate transfers from sit to stand from various surfaces without restriction.  (Met)  2. Patient will perform TUG score </= 10 seconds for improvement with walking tolerance. (Met)  3. Patient will improve single limb balance for at least 10 seconds to improve single limb loading for functional activities.  (Met)  4. Patient will be able to stand/ambulate at least 30 minutes with < 3/10 pain to improve standing/walking tolerance. (Met)    Plan     Plan of care Certification: 7/31/2024 to 10/15/2024.     Outpatient Physical Therapy 2 times weekly for 10 weeks to include the following interventions: Gait Training, Manual Therapy, Moist Heat/ Ice, Neuromuscular Re-ed, Patient Education, Self Care, Therapeutic Activities, and Therapeutic Exercise.     Ciera Reynolds, PT, DPT

## 2024-10-25 NOTE — PROGRESS NOTES
[facility]   Physical Therapy Treatment Note/ Discharge Summary     [facility]  [id number]    Therapy Diagnosis:   Encounter Diagnoses   Name Primary?    Impaired functional mobility, balance, gait, and endurance Yes    Decreased strength of lower extremity      Physician: Mary Lima MD    Visit Date: 10/21/2024    Physician Orders: PT Eval and Treat   Medical Diagnosis from Referral: M79.671 (ICD-10-CM) - Pain in right foot  Evaluation Date: 7/31/2024  Authorization Period Expiration: 7/6/2025  Plan of Care Expiration: 10/15/2024 to 10/25/2024  Progress Note Due: 10th visit  Date of Surgery: NA  Visit # / Visits authorized: 19/20  PTA Visit #: 2/5     FOTO: 3/ 3     Time In: 12:00 PM  Time Out: 12:25 PM  Total Billable Time: 25 minutes    Precautions: Standard       Subjective     Patient reports:that she is happy with her progress and she no longer has any pain.   She was compliant with home exercise program.  Response to previous treatment: no adverse effects  Functional change: ongoing    Pain: 1-3/10  Location: right foot      Objective      Objective Measures updated at progress report unless specified.     Treatment     Sunni received the treatments listed below:      therapeutic exercises to develop strength, endurance, ROM, flexibility, posture, and core stabilization for 0 minutes including:    manual therapy techniques: Joint mobilizations and Manual traction were applied to the: right ankle for 0 minutes, including:     neuromuscular re-education activities: to improve Balance, Coordination, Kinesthetic, Sense, Proprioception, Posture, and Motor Control for 00 minutes, including:     therapeutic activities to improve functional performance for 25 minutes, including:  NuStep 1000K steps/10' for gait mechanics   Discharge Summary    gait training to improve functional mobility and safety for 0 minutes, including:      Patient Education and  Home Exercises       Education provided:   - Patient was educated on HEP and on all therapeutic interventions performed during today's treatment visit.     Written Home Exercises Provided: Pt instructed to continue prior HEP. Exercises were reviewed and Sunni was able to demonstrate them prior to the end of the session.  Sunni demonstrated good  understanding of the education provided. See Electronic Medical Record under Patient Instructions for exercises provided during therapy sessions    Assessment     Sunni has attended physical therapy for 12 visits for foot pain and has met 7 of 7 goals. She has demonstrated improvement with range of motion, strength, FOTO score, TUG score, and 30 second sit to stand. Patient's main functional limitation remains gross lower extremity strength. Patient is appropriate for discharge at this time and plans to attend local gym to continue with progressions made during therapy. Patient provided comprehensive home exercise program and given resistance bands to continue with at home program as well. All questions were answered and concerns were addressed. Patient discharged this date.     Patient prognosis is Fair.     Patient will continue to benefit from skilled outpatient physical therapy to address the deficits listed in the problem list box on initial evaluation, provide pt/family education and to maximize pt's level of independence in the home and community environment.     Patient's spiritual, cultural and educational needs considered and pt agreeable to plan of care and goals.     Anticipated barriers to physical therapy: Therapy consistency, chronic, PMH, lifestyle     Goals:   Short Term Goals: 4 weeks   1. Patient will reduce maximal pain rating to < 3/10 pain to facilitate ability to sleep through the night and recover from PT interventions.(Met)  2. Patient will be able to stand/ambulate at least 10 minutes with < 3/10 pain to improve standing/walking tolerance.  (Met)  3. Patient will improve right ankle dorsiflexion range of motion to at least 5 degrees for improvement in gait mechanics. (Met)     Long Term Goals: 8-10 weeks   1. Patient will demonstrate > 4/5 lower quarter strength to facilitate transfers from sit to stand from various surfaces without restriction.  (Met)  2. Patient will perform TUG score </= 10 seconds for improvement with walking tolerance. (Met)  3. Patient will improve single limb balance for at least 10 seconds to improve single limb loading for functional activities.  (Met)  4. Patient will be able to stand/ambulate at least 30 minutes with < 3/10 pain to improve standing/walking tolerance. (Met)    Plan     Plan of care Certification: 7/31/2024 to 10/15/2024 to 10/25/2024     Discharge this date.     Ciera Reynolds, PT, DPT

## 2024-12-18 NOTE — PROGRESS NOTES
Called Baljinder Anthony III to discuss the results and recommendations as below. Patient verbalized understanding and agreement with no further questions.        Outpatient Therapy Discharge Summary     Name: Sunni No  [facility] Number: [id number]    Therapy Diagnosis: pain aggravated by walking   Medical Provider: Mary Lima    Physician Orders: PT Eval & treat  Medical Diagnosis: R hip pain  Evaluation Date: 12/26/2019      Date of Last visit: 3/11/20  Total Visits Received: 11  Missed Visits: 0    Assessment    Goals: see last therapy note for goals;     Discharge reason: Other:  COVID19 closed pool    Plan   This patient is discharged from Physical Therapy